# Patient Record
Sex: FEMALE | Race: WHITE | Employment: FULL TIME | URBAN - METROPOLITAN AREA
[De-identification: names, ages, dates, MRNs, and addresses within clinical notes are randomized per-mention and may not be internally consistent; named-entity substitution may affect disease eponyms.]

---

## 2017-01-23 RX ORDER — ATORVASTATIN CALCIUM 40 MG/1
TABLET, FILM COATED ORAL
Qty: 30 TABLET | Refills: 4 | Status: SHIPPED | OUTPATIENT
Start: 2017-01-23 | End: 2017-06-26 | Stop reason: SDUPTHER

## 2017-01-30 RX ORDER — GLIMEPIRIDE 4 MG/1
TABLET ORAL
Qty: 30 TABLET | Refills: 1 | Status: SHIPPED | OUTPATIENT
Start: 2017-01-30 | End: 2017-04-10 | Stop reason: SDUPTHER

## 2017-02-01 ENCOUNTER — OFFICE VISIT (OUTPATIENT)
Dept: FAMILY MEDICINE CLINIC | Age: 57
End: 2017-02-01

## 2017-02-01 VITALS
OXYGEN SATURATION: 98 % | BODY MASS INDEX: 32.19 KG/M2 | HEART RATE: 90 BPM | SYSTOLIC BLOOD PRESSURE: 140 MMHG | WEIGHT: 176 LBS | DIASTOLIC BLOOD PRESSURE: 84 MMHG

## 2017-02-01 DIAGNOSIS — H81.11 BENIGN POSITIONAL VERTIGO, RIGHT: Primary | ICD-10-CM

## 2017-02-01 PROCEDURE — 99213 OFFICE O/P EST LOW 20 MIN: CPT | Performed by: FAMILY MEDICINE

## 2017-02-15 ENCOUNTER — HOSPITAL ENCOUNTER (OUTPATIENT)
Dept: SURGERY | Age: 57
Discharge: HOME | End: 2017-02-15
Attending: PLASTIC SURGERY | Admitting: PLASTIC SURGERY

## 2017-02-15 VITALS
WEIGHT: 173.5 LBS | SYSTOLIC BLOOD PRESSURE: 134 MMHG | RESPIRATION RATE: 16 BRPM | OXYGEN SATURATION: 92 % | HEART RATE: 95 BPM | DIASTOLIC BLOOD PRESSURE: 75 MMHG | TEMPERATURE: 97.5 F | HEIGHT: 62 IN | BODY MASS INDEX: 31.93 KG/M2

## 2017-02-15 DIAGNOSIS — C50.911 MALIGNANT NEOPLASM OF RIGHT FEMALE BREAST (HCC): ICD-10-CM

## 2017-02-15 LAB
ANION GAP SERPL CALCULATED.3IONS-SCNC: 17 MMOL/L (ref 3–16)
BUN BLDV-MCNC: 14 MG/DL (ref 7–20)
CALCIUM SERPL-MCNC: 9.2 MG/DL (ref 8.3–10.6)
CHLORIDE BLD-SCNC: 105 MMOL/L (ref 99–110)
CO2: 21 MMOL/L (ref 21–32)
CREAT SERPL-MCNC: 0.6 MG/DL (ref 0.6–1.1)
GFR AFRICAN AMERICAN: >60
GFR NON-AFRICAN AMERICAN: >60
GLUCOSE BLD-MCNC: 116 MG/DL (ref 70–99)
GLUCOSE BLD-MCNC: 119 MG/DL (ref 70–99)
GLUCOSE BLD-MCNC: 129 MG/DL (ref 70–99)
HCT VFR BLD CALC: 40.4 % (ref 36–48)
HEMOGLOBIN: 13.4 G/DL (ref 12–16)
MCH RBC QN AUTO: 28 PG (ref 26–34)
MCHC RBC AUTO-ENTMCNC: 33.2 G/DL (ref 31–36)
MCV RBC AUTO: 84.3 FL (ref 80–100)
PDW BLD-RTO: 13.6 % (ref 12.4–15.4)
PERFORMED ON: ABNORMAL
PERFORMED ON: ABNORMAL
PLATELET # BLD: 304 K/UL (ref 135–450)
PMV BLD AUTO: 8.1 FL (ref 5–10.5)
POTASSIUM SERPL-SCNC: 4.5 MMOL/L (ref 3.5–5.1)
RBC # BLD: 4.79 M/UL (ref 4–5.2)
SODIUM BLD-SCNC: 143 MMOL/L (ref 136–145)
WBC # BLD: 9.7 K/UL (ref 4–11)

## 2017-02-15 RX ORDER — MEPERIDINE HYDROCHLORIDE 25 MG/ML
12.5 INJECTION INTRAMUSCULAR; INTRAVENOUS; SUBCUTANEOUS
Status: COMPLETED | OUTPATIENT
Start: 2017-02-15 | End: 2017-02-15

## 2017-02-15 RX ORDER — PROMETHAZINE HYDROCHLORIDE 25 MG/ML
6.25 INJECTION, SOLUTION INTRAMUSCULAR; INTRAVENOUS
Status: ACTIVE | OUTPATIENT
Start: 2017-02-15 | End: 2017-02-15

## 2017-02-15 RX ORDER — OXYCODONE AND ACETAMINOPHEN 7.5; 325 MG/1; MG/1
1 TABLET ORAL EVERY 4 HOURS PRN
Qty: 40 TABLET | Refills: 0 | Status: SHIPPED | OUTPATIENT
Start: 2017-02-15 | End: 2017-02-22

## 2017-02-15 RX ORDER — HYDROCODONE BITARTRATE AND ACETAMINOPHEN 5; 325 MG/1; MG/1
1 TABLET ORAL PRN
Status: COMPLETED | OUTPATIENT
Start: 2017-02-15 | End: 2017-02-15

## 2017-02-15 RX ORDER — LIDOCAINE HYDROCHLORIDE 10 MG/ML
0.5 INJECTION, SOLUTION EPIDURAL; INFILTRATION; INTRACAUDAL; PERINEURAL ONCE
Status: COMPLETED | OUTPATIENT
Start: 2017-02-15 | End: 2017-02-15

## 2017-02-15 RX ORDER — SODIUM CHLORIDE 0.9 % (FLUSH) 0.9 %
10 SYRINGE (ML) INJECTION EVERY 12 HOURS SCHEDULED
Status: DISCONTINUED | OUTPATIENT
Start: 2017-02-15 | End: 2017-02-16 | Stop reason: HOSPADM

## 2017-02-15 RX ORDER — SODIUM CHLORIDE 0.9 % (FLUSH) 0.9 %
10 SYRINGE (ML) INJECTION PRN
Status: DISCONTINUED | OUTPATIENT
Start: 2017-02-15 | End: 2017-02-16 | Stop reason: HOSPADM

## 2017-02-15 RX ORDER — LIDOCAINE HYDROCHLORIDE 10 MG/ML
1 INJECTION, SOLUTION EPIDURAL; INFILTRATION; INTRACAUDAL; PERINEURAL
Status: ACTIVE | OUTPATIENT
Start: 2017-02-15 | End: 2017-02-15

## 2017-02-15 RX ORDER — SODIUM CHLORIDE 9 MG/ML
INJECTION, SOLUTION INTRAVENOUS CONTINUOUS
Status: DISCONTINUED | OUTPATIENT
Start: 2017-02-15 | End: 2017-02-16 | Stop reason: HOSPADM

## 2017-02-15 RX ORDER — HYDROMORPHONE HCL 110MG/55ML
0.25 PATIENT CONTROLLED ANALGESIA SYRINGE INTRAVENOUS EVERY 5 MIN PRN
Status: DISCONTINUED | OUTPATIENT
Start: 2017-02-15 | End: 2017-02-16 | Stop reason: HOSPADM

## 2017-02-15 RX ORDER — HYDROCODONE BITARTRATE AND ACETAMINOPHEN 5; 325 MG/1; MG/1
2 TABLET ORAL PRN
Status: COMPLETED | OUTPATIENT
Start: 2017-02-15 | End: 2017-02-15

## 2017-02-15 RX ORDER — CEFAZOLIN SODIUM 2 G/100ML
2 INJECTION, SOLUTION INTRAVENOUS
Status: COMPLETED | OUTPATIENT
Start: 2017-02-15 | End: 2017-02-15

## 2017-02-15 RX ORDER — ONDANSETRON 2 MG/ML
4 INJECTION INTRAMUSCULAR; INTRAVENOUS
Status: ACTIVE | OUTPATIENT
Start: 2017-02-15 | End: 2017-02-15

## 2017-02-15 RX ORDER — CEPHALEXIN 500 MG/1
500 CAPSULE ORAL 4 TIMES DAILY
Qty: 28 CAPSULE | Refills: 0 | Status: SHIPPED | OUTPATIENT
Start: 2017-02-15 | End: 2017-06-23 | Stop reason: ALTCHOICE

## 2017-02-15 RX ORDER — HYDRALAZINE HYDROCHLORIDE 20 MG/ML
5 INJECTION INTRAMUSCULAR; INTRAVENOUS EVERY 10 MIN PRN
Status: DISCONTINUED | OUTPATIENT
Start: 2017-02-15 | End: 2017-02-16 | Stop reason: HOSPADM

## 2017-02-15 RX ORDER — LABETALOL HYDROCHLORIDE 5 MG/ML
5 INJECTION, SOLUTION INTRAVENOUS EVERY 10 MIN PRN
Status: DISCONTINUED | OUTPATIENT
Start: 2017-02-15 | End: 2017-02-16 | Stop reason: HOSPADM

## 2017-02-15 RX ORDER — HYDROMORPHONE HCL 110MG/55ML
0.5 PATIENT CONTROLLED ANALGESIA SYRINGE INTRAVENOUS EVERY 5 MIN PRN
Status: DISCONTINUED | OUTPATIENT
Start: 2017-02-15 | End: 2017-02-16 | Stop reason: HOSPADM

## 2017-02-15 RX ORDER — SODIUM CHLORIDE, SODIUM LACTATE, POTASSIUM CHLORIDE, CALCIUM CHLORIDE 600; 310; 30; 20 MG/100ML; MG/100ML; MG/100ML; MG/100ML
INJECTION, SOLUTION INTRAVENOUS CONTINUOUS
Status: DISCONTINUED | OUTPATIENT
Start: 2017-02-15 | End: 2017-02-16 | Stop reason: HOSPADM

## 2017-02-15 RX ORDER — DIPHENHYDRAMINE HYDROCHLORIDE 50 MG/ML
12.5 INJECTION INTRAMUSCULAR; INTRAVENOUS
Status: ACTIVE | OUTPATIENT
Start: 2017-02-15 | End: 2017-02-15

## 2017-02-15 RX ADMIN — MEPERIDINE HYDROCHLORIDE 12.5 MG: 25 INJECTION INTRAMUSCULAR; INTRAVENOUS; SUBCUTANEOUS at 11:52

## 2017-02-15 RX ADMIN — HYDROCODONE BITARTRATE AND ACETAMINOPHEN 1 TABLET: 5; 325 TABLET ORAL at 12:55

## 2017-02-15 RX ADMIN — SODIUM CHLORIDE: 9 INJECTION, SOLUTION INTRAVENOUS at 09:12

## 2017-02-15 RX ADMIN — Medication 0.5 MG: at 12:04

## 2017-02-15 RX ADMIN — LIDOCAINE HYDROCHLORIDE 0.2 ML: 10 INJECTION, SOLUTION EPIDURAL; INFILTRATION; INTRACAUDAL; PERINEURAL at 09:12

## 2017-02-15 RX ADMIN — CEFAZOLIN SODIUM 2 G: 2 INJECTION, SOLUTION INTRAVENOUS at 09:50

## 2017-02-15 ASSESSMENT — PAIN DESCRIPTION - LOCATION
LOCATION: BREAST
LOCATION: BREAST

## 2017-02-15 ASSESSMENT — PAIN DESCRIPTION - PAIN TYPE
TYPE: SURGICAL PAIN
TYPE: SURGICAL PAIN

## 2017-02-15 ASSESSMENT — PAIN SCALES - GENERAL
PAINLEVEL_OUTOF10: 5
PAINLEVEL_OUTOF10: 10
PAINLEVEL_OUTOF10: 5
PAINLEVEL_OUTOF10: 3

## 2017-02-15 ASSESSMENT — PAIN - FUNCTIONAL ASSESSMENT: PAIN_FUNCTIONAL_ASSESSMENT: 0-10

## 2017-02-15 ASSESSMENT — PAIN DESCRIPTION - ORIENTATION
ORIENTATION: RIGHT;LEFT
ORIENTATION: RIGHT;LEFT

## 2017-03-17 ENCOUNTER — OFFICE VISIT (OUTPATIENT)
Dept: FAMILY MEDICINE CLINIC | Age: 57
End: 2017-03-17

## 2017-03-17 VITALS
DIASTOLIC BLOOD PRESSURE: 78 MMHG | SYSTOLIC BLOOD PRESSURE: 132 MMHG | WEIGHT: 179 LBS | OXYGEN SATURATION: 98 % | HEART RATE: 84 BPM | BODY MASS INDEX: 32.74 KG/M2

## 2017-03-17 DIAGNOSIS — E11.9 TYPE 2 DIABETES MELLITUS WITHOUT COMPLICATION, WITHOUT LONG-TERM CURRENT USE OF INSULIN (HCC): Primary | ICD-10-CM

## 2017-03-17 DIAGNOSIS — E66.9 OBESITY (BMI 30-39.9): ICD-10-CM

## 2017-03-17 DIAGNOSIS — E78.00 ELEVATED CHOLESTEROL: ICD-10-CM

## 2017-03-17 PROCEDURE — 99214 OFFICE O/P EST MOD 30 MIN: CPT | Performed by: FAMILY MEDICINE

## 2017-03-17 ASSESSMENT — ENCOUNTER SYMPTOMS
CONSTIPATION: 0
TROUBLE SWALLOWING: 0
BACK PAIN: 0
COUGH: 0
ABDOMINAL PAIN: 0
SHORTNESS OF BREATH: 0

## 2017-04-01 ENCOUNTER — HOSPITAL ENCOUNTER (OUTPATIENT)
Dept: OTHER | Age: 57
Discharge: HOME | End: 2017-04-01
Attending: FAMILY MEDICINE | Admitting: FAMILY MEDICINE

## 2017-04-01 LAB
A/G RATIO: 1.3 (ref 1.1–2.2)
ALBUMIN SERPL-MCNC: 3.9 G/DL (ref 3.4–5)
ALP BLD-CCNC: 86 U/L (ref 40–129)
ALT SERPL-CCNC: 23 U/L (ref 10–40)
ANION GAP SERPL CALCULATED.3IONS-SCNC: 12 MMOL/L (ref 3–16)
AST SERPL-CCNC: 30 U/L (ref 15–37)
BILIRUB SERPL-MCNC: 0.3 MG/DL (ref 0–1)
BUN BLDV-MCNC: 11 MG/DL (ref 7–20)
CALCIUM SERPL-MCNC: 9.1 MG/DL (ref 8.3–10.6)
CHLORIDE BLD-SCNC: 106 MMOL/L (ref 99–110)
CHOLESTEROL, TOTAL: 128 MG/DL (ref 0–199)
CO2: 27 MMOL/L (ref 21–32)
CREAT SERPL-MCNC: 0.8 MG/DL (ref 0.6–1.1)
GFR AFRICAN AMERICAN: >60
GFR NON-AFRICAN AMERICAN: >60
GLOBULIN: 3 G/DL
GLUCOSE BLD-MCNC: 96 MG/DL (ref 70–99)
HDLC SERPL-MCNC: 45 MG/DL (ref 40–60)
LDL CHOLESTEROL CALCULATED: 65 MG/DL
POTASSIUM SERPL-SCNC: 4.9 MMOL/L (ref 3.5–5.1)
SODIUM BLD-SCNC: 145 MMOL/L (ref 136–145)
TOTAL PROTEIN: 6.9 G/DL (ref 6.4–8.2)
TRIGL SERPL-MCNC: 91 MG/DL (ref 0–150)
VLDLC SERPL CALC-MCNC: 18 MG/DL

## 2017-04-02 LAB
ESTIMATED AVERAGE GLUCOSE: 145.6 MG/DL
HBA1C MFR BLD: 6.7 %

## 2017-04-10 RX ORDER — GLIMEPIRIDE 4 MG/1
TABLET ORAL
Qty: 30 TABLET | Refills: 5 | Status: SHIPPED | OUTPATIENT
Start: 2017-04-10 | End: 2017-10-16

## 2017-04-27 RX ORDER — OXYBUTYNIN CHLORIDE 15 MG/1
TABLET, EXTENDED RELEASE ORAL
Qty: 30 TABLET | Refills: 5 | Status: SHIPPED | OUTPATIENT
Start: 2017-04-27 | End: 2018-02-13 | Stop reason: SDUPTHER

## 2017-05-04 ENCOUNTER — TELEPHONE (OUTPATIENT)
Dept: SURGERY | Age: 57
End: 2017-05-04

## 2017-05-11 ENCOUNTER — OFFICE VISIT (OUTPATIENT)
Dept: SURGERY | Age: 57
End: 2017-05-11

## 2017-05-11 VITALS
TEMPERATURE: 97.9 F | DIASTOLIC BLOOD PRESSURE: 97 MMHG | WEIGHT: 172 LBS | HEART RATE: 85 BPM | SYSTOLIC BLOOD PRESSURE: 140 MMHG | BODY MASS INDEX: 31.46 KG/M2

## 2017-05-11 DIAGNOSIS — Z85.3 PERSONAL HISTORY OF BREAST CANCER: Primary | ICD-10-CM

## 2017-05-11 PROCEDURE — 99213 OFFICE O/P EST LOW 20 MIN: CPT | Performed by: SURGERY

## 2017-05-11 RX ORDER — BACLOFEN 20 MG/1
20 TABLET ORAL
COMMUNITY
End: 2018-08-24

## 2017-06-23 ENCOUNTER — OFFICE VISIT (OUTPATIENT)
Dept: FAMILY MEDICINE CLINIC | Age: 57
End: 2017-06-23

## 2017-06-23 VITALS
WEIGHT: 179 LBS | DIASTOLIC BLOOD PRESSURE: 74 MMHG | BODY MASS INDEX: 32.74 KG/M2 | SYSTOLIC BLOOD PRESSURE: 130 MMHG | HEART RATE: 91 BPM | OXYGEN SATURATION: 97 %

## 2017-06-23 DIAGNOSIS — G89.29 CHRONIC BILATERAL LOW BACK PAIN WITHOUT SCIATICA: ICD-10-CM

## 2017-06-23 DIAGNOSIS — K76.0 FATTY LIVER DISEASE, NONALCOHOLIC: ICD-10-CM

## 2017-06-23 DIAGNOSIS — M54.50 CHRONIC BILATERAL LOW BACK PAIN WITHOUT SCIATICA: ICD-10-CM

## 2017-06-23 DIAGNOSIS — E11.9 TYPE 2 DIABETES MELLITUS WITHOUT COMPLICATION, WITHOUT LONG-TERM CURRENT USE OF INSULIN (HCC): Primary | ICD-10-CM

## 2017-06-23 DIAGNOSIS — R11.0 NAUSEA: ICD-10-CM

## 2017-06-23 PROCEDURE — 99214 OFFICE O/P EST MOD 30 MIN: CPT | Performed by: FAMILY MEDICINE

## 2017-06-23 RX ORDER — METFORMIN HYDROCHLORIDE 500 MG/1
1000 TABLET, EXTENDED RELEASE ORAL 2 TIMES DAILY
Qty: 120 TABLET | Refills: 3 | Status: SHIPPED | OUTPATIENT
Start: 2017-06-23 | End: 2018-03-28 | Stop reason: DRUGHIGH

## 2017-06-23 ASSESSMENT — ENCOUNTER SYMPTOMS
BACK PAIN: 1
CONSTIPATION: 0
SHORTNESS OF BREATH: 0
NAUSEA: 1
TROUBLE SWALLOWING: 0
COUGH: 0
ABDOMINAL PAIN: 0

## 2017-06-26 RX ORDER — ATORVASTATIN CALCIUM 40 MG/1
TABLET, FILM COATED ORAL
Qty: 30 TABLET | Refills: 3 | Status: SHIPPED | OUTPATIENT
Start: 2017-06-26 | End: 2017-11-07 | Stop reason: SDUPTHER

## 2017-08-24 ENCOUNTER — OFFICE VISIT (OUTPATIENT)
Dept: FAMILY MEDICINE CLINIC | Age: 57
End: 2017-08-24

## 2017-08-24 VITALS
HEART RATE: 80 BPM | OXYGEN SATURATION: 97 % | WEIGHT: 176 LBS | SYSTOLIC BLOOD PRESSURE: 130 MMHG | DIASTOLIC BLOOD PRESSURE: 86 MMHG | BODY MASS INDEX: 32.19 KG/M2

## 2017-08-24 DIAGNOSIS — R73.9 HYPERGLYCEMIA: ICD-10-CM

## 2017-08-24 DIAGNOSIS — R42 DIZZINESS: ICD-10-CM

## 2017-08-24 DIAGNOSIS — R41.82 ALTERED MENTAL STATUS, UNSPECIFIED ALTERED MENTAL STATUS TYPE: Primary | ICD-10-CM

## 2017-08-24 PROCEDURE — 99214 OFFICE O/P EST MOD 30 MIN: CPT | Performed by: PHYSICIAN ASSISTANT

## 2017-08-24 ASSESSMENT — ENCOUNTER SYMPTOMS
SORE THROAT: 0
SHORTNESS OF BREATH: 0
BACK PAIN: 0
VOICE CHANGE: 0
CONSTIPATION: 0
ABDOMINAL PAIN: 0
DIARRHEA: 0
COUGH: 0
CHEST TIGHTNESS: 0
TROUBLE SWALLOWING: 0
EYE PAIN: 0

## 2017-09-15 ENCOUNTER — OFFICE VISIT (OUTPATIENT)
Dept: SURGERY | Age: 57
End: 2017-09-15

## 2017-09-15 ENCOUNTER — HOSPITAL ENCOUNTER (OUTPATIENT)
Dept: MAMMOGRAPHY | Age: 57
Discharge: HOME | End: 2017-09-15

## 2017-09-15 VITALS
DIASTOLIC BLOOD PRESSURE: 86 MMHG | SYSTOLIC BLOOD PRESSURE: 139 MMHG | WEIGHT: 175 LBS | HEIGHT: 62 IN | HEART RATE: 82 BPM | BODY MASS INDEX: 32.2 KG/M2

## 2017-09-15 DIAGNOSIS — Z85.3 PERSONAL HISTORY OF BREAST CANCER: Primary | ICD-10-CM

## 2017-09-15 DIAGNOSIS — Z12.31 SCREENING MAMMOGRAM, ENCOUNTER FOR: ICD-10-CM

## 2017-09-15 DIAGNOSIS — C50.911 MALIGNANT NEOPLASM OF RIGHT FEMALE BREAST (HCC): ICD-10-CM

## 2017-09-15 DIAGNOSIS — Z85.3 PERSONAL HISTORY OF BREAST CANCER: ICD-10-CM

## 2017-09-15 PROCEDURE — 99213 OFFICE O/P EST LOW 20 MIN: CPT | Performed by: SURGERY

## 2017-09-25 ENCOUNTER — OFFICE VISIT (OUTPATIENT)
Dept: FAMILY MEDICINE CLINIC | Age: 57
End: 2017-09-25

## 2017-09-25 VITALS
HEART RATE: 89 BPM | DIASTOLIC BLOOD PRESSURE: 78 MMHG | SYSTOLIC BLOOD PRESSURE: 110 MMHG | OXYGEN SATURATION: 97 % | WEIGHT: 170 LBS | BODY MASS INDEX: 31.09 KG/M2

## 2017-09-25 DIAGNOSIS — R35.0 URINARY FREQUENCY: Primary | ICD-10-CM

## 2017-09-25 DIAGNOSIS — E78.00 ELEVATED CHOLESTEROL: ICD-10-CM

## 2017-09-25 DIAGNOSIS — E11.9 TYPE 2 DIABETES MELLITUS WITHOUT COMPLICATION, WITHOUT LONG-TERM CURRENT USE OF INSULIN (HCC): ICD-10-CM

## 2017-09-25 DIAGNOSIS — K76.0 FATTY LIVER DISEASE, NONALCOHOLIC: ICD-10-CM

## 2017-09-25 PROCEDURE — 90471 IMMUNIZATION ADMIN: CPT | Performed by: FAMILY MEDICINE

## 2017-09-25 PROCEDURE — 99214 OFFICE O/P EST MOD 30 MIN: CPT | Performed by: FAMILY MEDICINE

## 2017-09-25 PROCEDURE — 90630 INFLUENZA, QUADV, 18-64 YRS, ID, PF, MICRO INJ, 0.1ML (FLUZONE QUADV, PF): CPT | Performed by: FAMILY MEDICINE

## 2017-09-25 PROCEDURE — 81000 URINALYSIS NONAUTO W/SCOPE: CPT | Performed by: FAMILY MEDICINE

## 2017-09-25 RX ORDER — LINAGLIPTIN 5 MG/1
TABLET, FILM COATED ORAL
Qty: 30 TABLET | Refills: 4 | Status: SHIPPED | OUTPATIENT
Start: 2017-09-25 | End: 2018-04-04 | Stop reason: SDUPTHER

## 2017-09-25 ASSESSMENT — ENCOUNTER SYMPTOMS
ABDOMINAL PAIN: 0
SHORTNESS OF BREATH: 0
BACK PAIN: 1
CONSTIPATION: 0
COUGH: 0
TROUBLE SWALLOWING: 0
NAUSEA: 0

## 2017-09-26 ENCOUNTER — TELEPHONE (OUTPATIENT)
Dept: SURGERY | Age: 57
End: 2017-09-26

## 2017-09-27 LAB
ORGANISM: ABNORMAL
URINE CULTURE, ROUTINE: ABNORMAL
URINE CULTURE, ROUTINE: ABNORMAL

## 2017-09-27 RX ORDER — SULFAMETHOXAZOLE AND TRIMETHOPRIM 800; 160 MG/1; MG/1
1 TABLET ORAL 2 TIMES DAILY
Qty: 14 TABLET | Refills: 0 | Status: SHIPPED | OUTPATIENT
Start: 2017-09-27 | End: 2017-10-04

## 2017-10-16 RX ORDER — GLIMEPIRIDE 4 MG/1
TABLET ORAL
Qty: 30 TABLET | Refills: 2 | Status: SHIPPED | OUTPATIENT
Start: 2017-10-16 | End: 2018-01-16 | Stop reason: SDUPTHER

## 2017-11-06 ENCOUNTER — TELEPHONE (OUTPATIENT)
Dept: SURGERY | Age: 57
End: 2017-11-06

## 2017-11-07 RX ORDER — ATORVASTATIN CALCIUM 40 MG/1
TABLET, FILM COATED ORAL
Qty: 30 TABLET | Refills: 1 | Status: SHIPPED | OUTPATIENT
Start: 2017-11-07 | End: 2018-01-15 | Stop reason: SDUPTHER

## 2017-12-13 ENCOUNTER — HOSPITAL ENCOUNTER (OUTPATIENT)
Dept: MRI IMAGING | Age: 57
Discharge: HOME | End: 2017-12-13
Attending: INTERNAL MEDICINE | Admitting: INTERNAL MEDICINE

## 2017-12-13 DIAGNOSIS — G89.29 CHRONIC JAW PAIN: ICD-10-CM

## 2017-12-13 DIAGNOSIS — C50.911 MALIGNANT NEOPLASM OF RIGHT FEMALE BREAST (HCC): ICD-10-CM

## 2017-12-13 DIAGNOSIS — R68.84 CHRONIC JAW PAIN: ICD-10-CM

## 2017-12-13 RX ORDER — SODIUM CHLORIDE 0.9 % (FLUSH) 0.9 %
10 SYRINGE (ML) INJECTION ONCE
Status: COMPLETED | OUTPATIENT
Start: 2017-12-13 | End: 2017-12-13

## 2017-12-13 RX ADMIN — Medication 10 ML: at 13:15

## 2017-12-20 ENCOUNTER — TELEPHONE (OUTPATIENT)
Dept: SURGERY | Age: 57
End: 2017-12-20

## 2017-12-20 ENCOUNTER — HOSPITAL ENCOUNTER (OUTPATIENT)
Dept: OTHER | Age: 57
Discharge: HOME | End: 2017-12-20
Attending: FAMILY MEDICINE | Admitting: FAMILY MEDICINE

## 2017-12-20 DIAGNOSIS — E78.00 ELEVATED CHOLESTEROL: ICD-10-CM

## 2017-12-20 DIAGNOSIS — E11.9 TYPE 2 DIABETES MELLITUS WITHOUT COMPLICATION, WITHOUT LONG-TERM CURRENT USE OF INSULIN (HCC): Primary | ICD-10-CM

## 2017-12-20 DIAGNOSIS — E11.9 TYPE 2 DIABETES MELLITUS WITHOUT COMPLICATION, WITHOUT LONG-TERM CURRENT USE OF INSULIN (HCC): ICD-10-CM

## 2017-12-20 LAB
A/G RATIO: 1.3 (ref 1.1–2.2)
ALBUMIN SERPL-MCNC: 4.3 G/DL (ref 3.4–5)
ALP BLD-CCNC: 118 U/L (ref 40–129)
ALT SERPL-CCNC: 19 U/L (ref 10–40)
ANION GAP SERPL CALCULATED.3IONS-SCNC: 18 MMOL/L (ref 3–16)
AST SERPL-CCNC: 22 U/L (ref 15–37)
BILIRUB SERPL-MCNC: <0.2 MG/DL (ref 0–1)
BUN BLDV-MCNC: 13 MG/DL (ref 7–20)
CALCIUM SERPL-MCNC: 9.9 MG/DL (ref 8.3–10.6)
CHLORIDE BLD-SCNC: 103 MMOL/L (ref 99–110)
CHOLESTEROL, TOTAL: 145 MG/DL (ref 0–199)
CO2: 24 MMOL/L (ref 21–32)
CREAT SERPL-MCNC: 0.9 MG/DL (ref 0.6–1.1)
CREATININE URINE: 62.7 MG/DL (ref 28–259)
GFR AFRICAN AMERICAN: >60
GFR NON-AFRICAN AMERICAN: >60
GLOBULIN: 3.2 G/DL
GLUCOSE FASTING: 195 MG/DL (ref 70–99)
HDLC SERPL-MCNC: 53 MG/DL (ref 40–60)
LDL CHOLESTEROL CALCULATED: 43 MG/DL
MICROALBUMIN UR-MCNC: <1.2 MG/DL
MICROALBUMIN/CREAT UR-RTO: NORMAL MG/G (ref 0–30)
POTASSIUM SERPL-SCNC: 4.6 MMOL/L (ref 3.5–5.1)
SODIUM BLD-SCNC: 145 MMOL/L (ref 136–145)
TOTAL PROTEIN: 7.5 G/DL (ref 6.4–8.2)
TRIGL SERPL-MCNC: 244 MG/DL (ref 0–150)
VLDLC SERPL CALC-MCNC: 49 MG/DL

## 2017-12-21 LAB
ESTIMATED AVERAGE GLUCOSE: 165.7 MG/DL
HBA1C MFR BLD: 7.4 %

## 2017-12-27 ENCOUNTER — OFFICE VISIT (OUTPATIENT)
Dept: FAMILY MEDICINE CLINIC | Age: 57
End: 2017-12-27

## 2017-12-27 VITALS
SYSTOLIC BLOOD PRESSURE: 136 MMHG | BODY MASS INDEX: 31.46 KG/M2 | DIASTOLIC BLOOD PRESSURE: 90 MMHG | OXYGEN SATURATION: 98 % | WEIGHT: 172 LBS | HEART RATE: 100 BPM

## 2017-12-27 DIAGNOSIS — G89.29 CHRONIC BILATERAL LOW BACK PAIN WITHOUT SCIATICA: ICD-10-CM

## 2017-12-27 DIAGNOSIS — M54.50 CHRONIC BILATERAL LOW BACK PAIN WITHOUT SCIATICA: ICD-10-CM

## 2017-12-27 DIAGNOSIS — E78.00 ELEVATED CHOLESTEROL: ICD-10-CM

## 2017-12-27 DIAGNOSIS — E11.9 TYPE 2 DIABETES MELLITUS WITHOUT COMPLICATION, WITHOUT LONG-TERM CURRENT USE OF INSULIN (HCC): Primary | ICD-10-CM

## 2017-12-27 PROCEDURE — G8484 FLU IMMUNIZE NO ADMIN: HCPCS | Performed by: FAMILY MEDICINE

## 2017-12-27 PROCEDURE — G8417 CALC BMI ABV UP PARAM F/U: HCPCS | Performed by: FAMILY MEDICINE

## 2017-12-27 PROCEDURE — 3017F COLORECTAL CA SCREEN DOC REV: CPT | Performed by: FAMILY MEDICINE

## 2017-12-27 PROCEDURE — 3045F PR MOST RECENT HEMOGLOBIN A1C LEVEL 7.0-9.0%: CPT | Performed by: FAMILY MEDICINE

## 2017-12-27 PROCEDURE — G8427 DOCREV CUR MEDS BY ELIG CLIN: HCPCS | Performed by: FAMILY MEDICINE

## 2017-12-27 PROCEDURE — 3014F SCREEN MAMMO DOC REV: CPT | Performed by: FAMILY MEDICINE

## 2017-12-27 PROCEDURE — 99214 OFFICE O/P EST MOD 30 MIN: CPT | Performed by: FAMILY MEDICINE

## 2017-12-27 PROCEDURE — 1036F TOBACCO NON-USER: CPT | Performed by: FAMILY MEDICINE

## 2017-12-27 RX ORDER — TRAMADOL HYDROCHLORIDE 50 MG/1
50 TABLET ORAL EVERY 6 HOURS PRN
Qty: 60 TABLET | Refills: 2 | Status: SHIPPED | OUTPATIENT
Start: 2017-12-27 | End: 2018-08-24 | Stop reason: SDUPTHER

## 2017-12-27 ASSESSMENT — ENCOUNTER SYMPTOMS
ABDOMINAL PAIN: 0
BACK PAIN: 1
CONSTIPATION: 0
COUGH: 0
TROUBLE SWALLOWING: 0
NAUSEA: 0
SHORTNESS OF BREATH: 0

## 2017-12-27 NOTE — PROGRESS NOTES
Subjective:      Patient ID: Zechariah Linares is a 65 y.o. female. HPI Patient presents for a follow up diabetes and elevated cholesterol. And low back pain. She complains of pain at times in her lower back and takes tramadol for this without any complications. OARRS report accessed and reviewed     Treatment Adherence:   Medication compliance:  compliant all of the time  Diet compliance:  noncompliant: Does not follow any special diet  Weight trend: stable  Current exercise: Mild exercise at work where she works as a   Barriers: none    Diabetes Mellitus Type 2: Current symptoms/problems include none. Home blood sugar records: patient does not test  Any episodes of hypoglycemia? no  Eye exam current (within one year): no  Tobacco history: She  reports that she has quit smoking. She has a 10.00 pack-year smoking history. She has never used smokeless tobacco.   Daily Aspirin? Yes    Hypertension:  Home blood pressure monitoring: No.  She is not adherent to a low sodium diet. Patient denies chest pain and shortness of breath. Antihypertensive medication side effects: no medication side effects noted. Use of agents associated with hypertension: none. Hyperlipidemia:  No new myalgias or GI upset on atorvastatin (Lipitor).        Lab Results   Component Value Date    LABA1C 7.4 12/20/2017    LABA1C 6.7 04/01/2017    LABA1C 6.8 11/05/2016     Lab Results   Component Value Date    LABMICR <1.20 12/20/2017    CREATININE 0.9 12/20/2017     Lab Results   Component Value Date    ALT 19 12/20/2017    AST 22 12/20/2017     Lab Results   Component Value Date    CHOL 145 12/20/2017    TRIG 244 (H) 12/20/2017    HDL 53 12/20/2017    LDLCALC 43 12/20/2017        Current Outpatient Prescriptions on File Prior to Visit   Medication Sig Dispense Refill    atorvastatin (LIPITOR) 40 MG tablet TAKE ONE TABLET BY MOUTH DAILY 30 tablet 1    glimepiride (AMARYL) 4 MG tablet TAKE ONE TABLET BY MOUTH EVERY MORNING BEFORE BREAKFAST 30 tablet 2    TRADJENTA 5 MG tablet TAKE ONE TABLET BY MOUTH DAILY 30 tablet 4    tamoxifen (NOLVADEX) 20 MG tablet TAKE ONE TABLET BY MOUTH DAILY 30 tablet 4    metFORMIN (GLUCOPHAGE XR) 500 MG extended release tablet Take 2 tablets by mouth 2 times daily 120 tablet 3    baclofen (LIORESAL) 20 MG tablet Take 20 mg by mouth      oxybutynin (DITROPAN XL) 15 MG extended release tablet TAKE ONE TABLET BY MOUTH DAILY 30 tablet 5    prochlorperazine (COMPAZINE) 10 MG tablet 1 tab at bedtime to prevent nausea 30 tablet 1    glucose blood VI test strips (ONE TOUCH TEST STRIPS) strip 1 each by In Vitro route 2 times daily 100 each 3    ONE TOUCH LANCETS MISC 1 each by Does not apply route 2 times daily 100 each 3    Blood Glucose Monitoring Suppl (ONE TOUCH ULTRA 2) W/DEVICE KIT 1 kit by Does not apply route daily 1 kit 0    aspirin EC 81 MG EC tablet Take 1 tablet by mouth daily 30 tablet 12     No current facility-administered medications on file prior to visit. Social History     Social History    Marital status:      Spouse name: N/A    Number of children: N/A    Years of education: N/A     Occupational History    Not on file. Social History Main Topics    Smoking status: Former Smoker     Packs/day: 1.00     Years: 10.00    Smokeless tobacco: Never Used    Alcohol use 0.0 oz/week      Comment: occasional    Drug use: No    Sexual activity: Not on file     Other Topics Concern    Not on file     Social History Narrative    No narrative on file         Review of Systems   Constitutional: Negative for fatigue and fever. HENT: Negative for hearing loss and trouble swallowing. Eyes: Negative for visual disturbance. Respiratory: Negative for cough and shortness of breath. Cardiovascular: Negative for chest pain. Gastrointestinal: Negative for abdominal pain, constipation and nausea. Genitourinary: Negative for difficulty urinating and frequency.

## 2018-01-05 ENCOUNTER — TELEPHONE (OUTPATIENT)
Dept: SURGERY | Age: 58
End: 2018-01-05

## 2018-01-05 NOTE — TELEPHONE ENCOUNTER
Patient returned call to office to inform us she saw Dr. Leonela Oviedo about her right breast implant, and right breast pain. She is continuing to have pain for the past 2 months. Stated he said\" It looks like it shrank, could be leaking I don't know. \" I was told he said he \"could take her back to the o.r. To fix it. \" Patient and her  not happy with this visit. They inquired about Dr. Silverio Generous number again, number given. They want a second opinion.

## 2018-01-05 NOTE — TELEPHONE ENCOUNTER
Patient's  Gregoria Mcknight, states wife has been experiencing pain, right side breast, where she had a mastectomy and implant 4/16 @ [facility]. Patient was referred by Dr Laisha Jamison and is requesting return call to schedule appointment.

## 2018-01-09 ENCOUNTER — OFFICE VISIT (OUTPATIENT)
Dept: SURGERY | Age: 58
End: 2018-01-09

## 2018-01-09 VITALS
TEMPERATURE: 98.5 F | HEART RATE: 90 BPM | BODY MASS INDEX: 32.02 KG/M2 | WEIGHT: 174 LBS | DIASTOLIC BLOOD PRESSURE: 80 MMHG | HEIGHT: 62 IN | SYSTOLIC BLOOD PRESSURE: 144 MMHG | OXYGEN SATURATION: 98 %

## 2018-01-09 DIAGNOSIS — C50.911 MALIGNANT NEOPLASM OF RIGHT FEMALE BREAST, UNSPECIFIED ESTROGEN RECEPTOR STATUS, UNSPECIFIED SITE OF BREAST (HCC): Primary | ICD-10-CM

## 2018-01-09 PROCEDURE — G8417 CALC BMI ABV UP PARAM F/U: HCPCS | Performed by: SURGERY

## 2018-01-09 PROCEDURE — 3017F COLORECTAL CA SCREEN DOC REV: CPT | Performed by: SURGERY

## 2018-01-09 PROCEDURE — G8427 DOCREV CUR MEDS BY ELIG CLIN: HCPCS | Performed by: SURGERY

## 2018-01-09 PROCEDURE — 3014F SCREEN MAMMO DOC REV: CPT | Performed by: SURGERY

## 2018-01-09 PROCEDURE — 1036F TOBACCO NON-USER: CPT | Performed by: SURGERY

## 2018-01-09 PROCEDURE — G8484 FLU IMMUNIZE NO ADMIN: HCPCS | Performed by: SURGERY

## 2018-01-09 PROCEDURE — 99205 OFFICE O/P NEW HI 60 MIN: CPT | Performed by: SURGERY

## 2018-01-09 ASSESSMENT — ENCOUNTER SYMPTOMS
NAUSEA: 0
VOMITING: 0
ABDOMINAL PAIN: 0
SHORTNESS OF BREATH: 0
BACK PAIN: 1
EYE PAIN: 0

## 2018-01-09 NOTE — PROGRESS NOTES
[facility]    CC: Breast CA     Referring physician: Mitzy Otoole MD    HPI: This is a 65 y.o. female with a PMHx as delineated below who presents in consultation for breast reconstruction. She underwent right mastectomy with immediate tissue expander placement by Dr. Nasim España. She then underwent an exchange for permanent implant (685cc MemoryShape, medium height , high profile) and matching contralateral mastopexy. She also has completed a nipple reconstruction on the right. She is not happy with the results. She feels that they asymmetric, flat,  and she is having significant pain on the right that has been painful on the lateral aspect. Plastic surgery was consulted for a second opinion regarding the above mentioned complaints.   The specifics of her breast cancer workup / treatment is as follows:     Diagnosis: DCIS  Mo/Yr Diagnosed:   Breast Involved: Right  Surgery: Right mastectomy with implant based reconstruction  Date of Surgery: 16  Tumor Size & rdGrdrrdarddrderd:rd rd3rd Luis Enrique status: Negative  ER: Positive MN: Positive HER2: Unknown    Post Op Plan:  Oncologist: Tiffany Castro MD  Radiation: No    Chemo/Meds: No  Pregnancy/Miscarriages: 2 / 0    PMHx:   Past Medical History:   Diagnosis Date    Cancer ([address]     right breast    Dizziness     Headache(784.0)     Hyperlipidemia     Papilloma of breast     bilateral    Type II or unspecified type diabetes mellitus without mention of complication, not stated as uncontrolled    Hemoglobin A1c - 7.4 ()    PSHx:   Past Surgical History:   Procedure Laterality Date    BREAST BIOPSY Bilateral 06/15/2016    : RIGHT NEEDLE LOCALIZATION OF TWO SITES EXCISIONAL BREAST    BREAST SURGERY Bilateral 02/15/2017    SECOND STAGE RIGHT BREAST RECONSTRUCTION WITH PLACEMENT OF SILICONE BREAST IMPLANT ; left breast reduction     SECTION      MASTECTOMY Right 2016     RIGHT SKIN SAPARING MASTECTOMY WITH Tc99 AND METHYLANE easily (Tamoxifin). Psychiatric/Behavioral: Negative for depression and suicidal ideas. EXAM    GEN: NAD  CVS: RRR  RESP: No respiratory distress  ABD: soft/NT/obese  BACK: Bilateral latiss function intact  BREAST:   Physical Exam    Bra size: 42B  Desired bra size: C  Ptosis grade:   R: 1   L: 2  The left breast size is larger than the right breast.  There were no palpable masses with no palpable lymphadenopathy in the ipsilateral right axilla. Nipple retraction: No  Deficiency inferior aspect of the right breast  Nipple without areolar reconstruction  Excess tissue laterally on both breasts    Left breast sternal notch to nipple: 23.5 cm  Right breast sternal notch to nipple: 20 cm    Left breast nipple to inframammary fold: 7 cm  Right breast nipple to inframammary fold: 6.5 cm    Left breast width 17 cm  Right breast width 17 cm    IMAGING: US breast   Narrative   EXAMINATION:   2 SITE ULTRASOUND-GUIDED LEFT BREAST BIOPSY WITH VACUUM-ASSIST       ULTRASOUND-GUIDED CLIP PLACEMENT             IMP: 65 y.o. female with breast cancer who presents for discussion regarding revision breast reconstruction options  PLAN: Would benefit from excision of her lateral breast tissue, exchange for larger shaped implant, & possible inferior fat grafting. Would then perform a staged areolar reconstruction with a FTSG from the groin. She needs to optimize her sugars (<6.5) and improve her nutrition / protein intake in the interim. Will see again in March and then work toward surgery in April/May. A discussion regarding surgical options including immediate vs delayed approaches, implant based vs autologous, as well as additional planned revisional surgeries was performed with the patient and family. Multiple autologous donor sites were discussed as well. The risks, benefits, alternatives, outcomes, personnel involved were discussed.   Specifically, the risks including, but not limited to: bleeding that may necessitate transfusion or operation, infection, seroma, reoperation, nonhealing, poor cosmetic outcome, asymmetry, loss of implant, scarring, partial or total flap loss, donor site morbidity, VTE (DVT/PE), and death were discussed. All questions were answered in a satisfactory manner according to the patient. This consultation lasted 60 minutes with > 50% of the time spent in counseling and coordination of care.     Roberto Naranjo MD  [facility]  01/09/18

## 2018-01-09 NOTE — Clinical Note
Very nice family. I think that she would benefit from excision of her lateral breast tissue bilaterally, implant exchange with possible fat grafting inferiorly, followed by a staged areolar reconstruction. She needs to optimize her glycemic control prior to surgery and I will see her in March to ensure that she is doing well. She seems very motivated. Plan for potential surgery in April/May. Let me know if you need anything from me. Thanks!!  Hilton Mary

## 2018-01-15 RX ORDER — ATORVASTATIN CALCIUM 40 MG/1
TABLET, FILM COATED ORAL
Qty: 30 TABLET | Refills: 11 | Status: SHIPPED | OUTPATIENT
Start: 2018-01-15 | End: 2019-01-23 | Stop reason: SDUPTHER

## 2018-01-16 RX ORDER — GLIMEPIRIDE 4 MG/1
TABLET ORAL
Qty: 30 TABLET | Refills: 1 | Status: SHIPPED | OUTPATIENT
Start: 2018-01-16 | End: 2018-03-18 | Stop reason: SDUPTHER

## 2018-02-13 RX ORDER — OXYBUTYNIN CHLORIDE 15 MG/1
TABLET, EXTENDED RELEASE ORAL
Qty: 30 TABLET | Refills: 4 | Status: SHIPPED | OUTPATIENT
Start: 2018-02-13 | End: 2018-07-12 | Stop reason: SDUPTHER

## 2018-03-05 ENCOUNTER — TELEPHONE (OUTPATIENT)
Dept: FAMILY MEDICINE CLINIC | Age: 58
End: 2018-03-05

## 2018-03-05 NOTE — TELEPHONE ENCOUNTER
Patient's  states [address] is waiting for a prior authorization. TRADJENTA 5 MG tablet 30 tablet 4 9/25/2017     Sig: TAKE ONE TABLET BY MOUTH DAILY        Pharmacy:  [address] [phone]        Please call patient to advise.     [phone]

## 2018-03-07 ENCOUNTER — TELEPHONE (OUTPATIENT)
Dept: FAMILY MEDICINE CLINIC | Age: 58
End: 2018-03-07

## 2018-03-19 RX ORDER — GLIMEPIRIDE 4 MG/1
TABLET ORAL
Qty: 30 TABLET | Refills: 0 | Status: SHIPPED | OUTPATIENT
Start: 2018-03-19 | End: 2018-04-17 | Stop reason: SDUPTHER

## 2018-03-21 ENCOUNTER — OFFICE VISIT (OUTPATIENT)
Dept: SURGERY | Age: 58
End: 2018-03-21

## 2018-03-21 VITALS
BODY MASS INDEX: 31.83 KG/M2 | WEIGHT: 173 LBS | HEIGHT: 62 IN | TEMPERATURE: 98.5 F | DIASTOLIC BLOOD PRESSURE: 89 MMHG | OXYGEN SATURATION: 99 % | RESPIRATION RATE: 15 BRPM | HEART RATE: 81 BPM | SYSTOLIC BLOOD PRESSURE: 170 MMHG

## 2018-03-21 DIAGNOSIS — C50.911 MALIGNANT NEOPLASM OF RIGHT FEMALE BREAST, UNSPECIFIED ESTROGEN RECEPTOR STATUS, UNSPECIFIED SITE OF BREAST (HCC): Primary | ICD-10-CM

## 2018-03-21 PROCEDURE — 99999 PR OFFICE/OUTPT VISIT,PROCEDURE ONLY: CPT | Performed by: SURGERY

## 2018-03-24 ENCOUNTER — HOSPITAL ENCOUNTER (OUTPATIENT)
Dept: OTHER | Age: 58
Discharge: HOME | End: 2018-03-24
Attending: FAMILY MEDICINE | Admitting: FAMILY MEDICINE

## 2018-03-24 DIAGNOSIS — E11.9 TYPE 2 DIABETES MELLITUS WITHOUT COMPLICATION, WITHOUT LONG-TERM CURRENT USE OF INSULIN (HCC): ICD-10-CM

## 2018-03-25 LAB
ESTIMATED AVERAGE GLUCOSE: 157.1 MG/DL
HBA1C MFR BLD: 7.1 %

## 2018-03-28 ENCOUNTER — OFFICE VISIT (OUTPATIENT)
Dept: FAMILY MEDICINE CLINIC | Age: 58
End: 2018-03-28

## 2018-03-28 VITALS
OXYGEN SATURATION: 98 % | WEIGHT: 174 LBS | HEART RATE: 90 BPM | SYSTOLIC BLOOD PRESSURE: 128 MMHG | HEIGHT: 61 IN | BODY MASS INDEX: 32.85 KG/M2 | DIASTOLIC BLOOD PRESSURE: 84 MMHG

## 2018-03-28 DIAGNOSIS — G89.29 CHRONIC MIDLINE THORACIC BACK PAIN: ICD-10-CM

## 2018-03-28 DIAGNOSIS — N39.41 URGE INCONTINENCE: ICD-10-CM

## 2018-03-28 DIAGNOSIS — E66.9 OBESITY (BMI 30-39.9): ICD-10-CM

## 2018-03-28 DIAGNOSIS — M54.6 CHRONIC MIDLINE THORACIC BACK PAIN: ICD-10-CM

## 2018-03-28 DIAGNOSIS — E11.9 TYPE 2 DIABETES MELLITUS WITHOUT COMPLICATION, WITHOUT LONG-TERM CURRENT USE OF INSULIN (HCC): Primary | ICD-10-CM

## 2018-03-28 PROCEDURE — 3045F PR MOST RECENT HEMOGLOBIN A1C LEVEL 7.0-9.0%: CPT | Performed by: FAMILY MEDICINE

## 2018-03-28 PROCEDURE — G8482 FLU IMMUNIZE ORDER/ADMIN: HCPCS | Performed by: FAMILY MEDICINE

## 2018-03-28 PROCEDURE — G8417 CALC BMI ABV UP PARAM F/U: HCPCS | Performed by: FAMILY MEDICINE

## 2018-03-28 PROCEDURE — 3014F SCREEN MAMMO DOC REV: CPT | Performed by: FAMILY MEDICINE

## 2018-03-28 PROCEDURE — 1036F TOBACCO NON-USER: CPT | Performed by: FAMILY MEDICINE

## 2018-03-28 PROCEDURE — 3017F COLORECTAL CA SCREEN DOC REV: CPT | Performed by: FAMILY MEDICINE

## 2018-03-28 PROCEDURE — 99214 OFFICE O/P EST MOD 30 MIN: CPT | Performed by: FAMILY MEDICINE

## 2018-03-28 PROCEDURE — G8427 DOCREV CUR MEDS BY ELIG CLIN: HCPCS | Performed by: FAMILY MEDICINE

## 2018-03-28 RX ORDER — METFORMIN HYDROCHLORIDE 500 MG/1
500 TABLET, EXTENDED RELEASE ORAL 2 TIMES DAILY
Qty: 120 TABLET | Refills: 3 | Status: SHIPPED | OUTPATIENT
Start: 2018-03-28 | End: 2019-03-19 | Stop reason: SDUPTHER

## 2018-03-28 ASSESSMENT — ENCOUNTER SYMPTOMS
HEARTBURN: 0
CONSTIPATION: 0
BACK PAIN: 1
BLURRED VISION: 0
DOUBLE VISION: 0
DIARRHEA: 0
SHORTNESS OF BREATH: 0

## 2018-03-28 NOTE — PROGRESS NOTES
tablet 4    atorvastatin (LIPITOR) 40 MG tablet TAKE ONE TABLET BY MOUTH DAILY 30 tablet 11    TRADJENTA 5 MG tablet TAKE ONE TABLET BY MOUTH DAILY 30 tablet 4    tamoxifen (NOLVADEX) 20 MG tablet TAKE ONE TABLET BY MOUTH DAILY 30 tablet 4    baclofen (LIORESAL) 20 MG tablet Take 20 mg by mouth      glucose blood VI test strips (ONE TOUCH TEST STRIPS) strip 1 each by In Vitro route 2 times daily 100 each 3    ONE TOUCH LANCETS MISC 1 each by Does not apply route 2 times daily 100 each 3    Blood Glucose Monitoring Suppl (ONE TOUCH ULTRA 2) W/DEVICE KIT 1 kit by Does not apply route daily 1 kit 0    aspirin EC 81 MG EC tablet Take 1 tablet by mouth daily 30 tablet 12    traMADol (ULTRAM) 50 MG tablet Take 1 tablet by mouth every 6 hours as needed for Pain . 60 tablet 2     No current facility-administered medications on file prior to visit. Review of Systems   Constitutional: Negative for malaise/fatigue and weight loss. HENT: Negative for congestion and hearing loss. Eyes: Negative for blurred vision and double vision. Respiratory: Negative for shortness of breath. Cardiovascular: Negative for chest pain and leg swelling. Gastrointestinal: Negative for constipation, diarrhea and heartburn. Genitourinary: Positive for frequency. Musculoskeletal: Positive for back pain. Negative for myalgias. Neurological: Negative for sensory change and focal weakness. Endo/Heme/Allergies: Negative for polydipsia. Psychiatric/Behavioral: Negative for depression. Physical Exam   Constitutional: She is oriented to person, place, and time. She appears well-developed and well-nourished. obese   Eyes: EOM are normal. Pupils are equal, round, and reactive to light. Neck: Normal carotid pulses present. Carotid bruit is not present. No thyromegaly present. Cardiovascular: Normal rate, regular rhythm, normal heart sounds, intact distal pulses and normal pulses.     Pulmonary/Chest: Effort

## 2018-04-04 RX ORDER — LINAGLIPTIN 5 MG/1
TABLET, FILM COATED ORAL
Qty: 30 TABLET | Refills: 3 | Status: SHIPPED | OUTPATIENT
Start: 2018-04-04 | End: 2018-08-09 | Stop reason: SDUPTHER

## 2018-04-13 ENCOUNTER — HOSPITAL ENCOUNTER (OUTPATIENT)
Dept: ENDOSCOPY | Age: 58
Discharge: HOME | End: 2018-04-13
Attending: INTERNAL MEDICINE | Admitting: INTERNAL MEDICINE

## 2018-04-13 DIAGNOSIS — C50.911 MALIGNANT NEOPLASM OF RIGHT FEMALE BREAST (HCC): ICD-10-CM

## 2018-04-13 LAB
GLUCOSE BLD-MCNC: 107 MG/DL (ref 70–99)
GLUCOSE BLD-MCNC: 93 MG/DL (ref 70–99)
PERFORMED ON: ABNORMAL
PERFORMED ON: NORMAL

## 2018-04-17 RX ORDER — GLIMEPIRIDE 4 MG/1
TABLET ORAL
Qty: 30 TABLET | Refills: 2 | Status: SHIPPED | OUTPATIENT
Start: 2018-04-17 | End: 2018-07-22 | Stop reason: SDUPTHER

## 2018-07-12 RX ORDER — OXYBUTYNIN CHLORIDE 15 MG/1
TABLET, EXTENDED RELEASE ORAL
Qty: 30 TABLET | Refills: 3 | Status: SHIPPED | OUTPATIENT
Start: 2018-07-12 | End: 2018-11-10 | Stop reason: SDUPTHER

## 2018-07-23 RX ORDER — GLIMEPIRIDE 4 MG/1
TABLET ORAL
Qty: 30 TABLET | Refills: 1 | Status: SHIPPED | OUTPATIENT
Start: 2018-07-23 | End: 2018-09-20 | Stop reason: SDUPTHER

## 2018-08-09 RX ORDER — LINAGLIPTIN 5 MG/1
TABLET, FILM COATED ORAL
Qty: 30 TABLET | Refills: 2 | Status: SHIPPED | OUTPATIENT
Start: 2018-08-09 | End: 2018-11-10 | Stop reason: SDUPTHER

## 2018-08-24 ENCOUNTER — OFFICE VISIT (OUTPATIENT)
Dept: FAMILY MEDICINE CLINIC | Age: 58
End: 2018-08-24

## 2018-08-24 VITALS
SYSTOLIC BLOOD PRESSURE: 150 MMHG | BODY MASS INDEX: 31.48 KG/M2 | HEART RATE: 73 BPM | WEIGHT: 168 LBS | DIASTOLIC BLOOD PRESSURE: 70 MMHG | OXYGEN SATURATION: 98 %

## 2018-08-24 DIAGNOSIS — I10 ESSENTIAL HYPERTENSION: ICD-10-CM

## 2018-08-24 DIAGNOSIS — E78.00 ELEVATED CHOLESTEROL: ICD-10-CM

## 2018-08-24 DIAGNOSIS — E11.9 TYPE 2 DIABETES MELLITUS WITHOUT COMPLICATION, WITHOUT LONG-TERM CURRENT USE OF INSULIN (HCC): Primary | ICD-10-CM

## 2018-08-24 DIAGNOSIS — G89.29 CHRONIC BILATERAL LOW BACK PAIN WITHOUT SCIATICA: ICD-10-CM

## 2018-08-24 DIAGNOSIS — M54.50 CHRONIC BILATERAL LOW BACK PAIN WITHOUT SCIATICA: ICD-10-CM

## 2018-08-24 DIAGNOSIS — E66.9 OBESITY (BMI 30-39.9): ICD-10-CM

## 2018-08-24 PROCEDURE — G8417 CALC BMI ABV UP PARAM F/U: HCPCS | Performed by: FAMILY MEDICINE

## 2018-08-24 PROCEDURE — 3017F COLORECTAL CA SCREEN DOC REV: CPT | Performed by: FAMILY MEDICINE

## 2018-08-24 PROCEDURE — G8428 CUR MEDS NOT DOCUMENT: HCPCS | Performed by: FAMILY MEDICINE

## 2018-08-24 PROCEDURE — 3045F PR MOST RECENT HEMOGLOBIN A1C LEVEL 7.0-9.0%: CPT | Performed by: FAMILY MEDICINE

## 2018-08-24 PROCEDURE — 99214 OFFICE O/P EST MOD 30 MIN: CPT | Performed by: FAMILY MEDICINE

## 2018-08-24 PROCEDURE — 2022F DILAT RTA XM EVC RTNOPTHY: CPT | Performed by: FAMILY MEDICINE

## 2018-08-24 PROCEDURE — 1036F TOBACCO NON-USER: CPT | Performed by: FAMILY MEDICINE

## 2018-08-24 RX ORDER — LISINOPRIL 10 MG/1
10 TABLET ORAL DAILY
Qty: 30 TABLET | Refills: 3 | Status: SHIPPED | OUTPATIENT
Start: 2018-08-24 | End: 2018-12-25 | Stop reason: SDUPTHER

## 2018-08-24 RX ORDER — TRAMADOL HYDROCHLORIDE 50 MG/1
50 TABLET ORAL EVERY 6 HOURS PRN
Qty: 60 TABLET | Refills: 0 | Status: SHIPPED | OUTPATIENT
Start: 2018-08-24 | End: 2018-11-06 | Stop reason: SDUPTHER

## 2018-08-24 ASSESSMENT — ENCOUNTER SYMPTOMS
DIARRHEA: 0
BACK PAIN: 1
CONSTIPATION: 0
DOUBLE VISION: 0
HEARTBURN: 0
BLURRED VISION: 0
SHORTNESS OF BREATH: 0

## 2018-08-24 NOTE — PROGRESS NOTES
53 12/20/2017    LDLCALC 43 12/20/2017         Social History     Social History    Marital status:      Spouse name: N/A    Number of children: N/A    Years of education: N/A     Occupational History    Not on file. Social History Main Topics    Smoking status: Former Smoker     Packs/day: 1.00     Years: 10.00    Smokeless tobacco: Never Used    Alcohol use 0.0 oz/week      Comment: occasional    Drug use: No    Sexual activity: Not on file     Other Topics Concern    Not on file     Social History Narrative    No narrative on file      Current Outpatient Prescriptions on File Prior to Visit   Medication Sig Dispense Refill    TRADJENTA 5 MG tablet TAKE ONE TABLET BY MOUTH DAILY 30 tablet 2    glimepiride (AMARYL) 4 MG tablet TAKE ONE TABLET BY MOUTH EVERY MORNING BEFORE BREAKFAST 30 tablet 1    oxybutynin (DITROPAN XL) 15 MG extended release tablet TAKE ONE TABLET BY MOUTH DAILY 30 tablet 3    metFORMIN (GLUCOPHAGE XR) 500 MG extended release tablet Take 1 tablet by mouth 2 times daily 120 tablet 3    atorvastatin (LIPITOR) 40 MG tablet TAKE ONE TABLET BY MOUTH DAILY 30 tablet 11    tamoxifen (NOLVADEX) 20 MG tablet TAKE ONE TABLET BY MOUTH DAILY 30 tablet 4    glucose blood VI test strips (ONE TOUCH TEST STRIPS) strip 1 each by In Vitro route 2 times daily 100 each 3    ONE TOUCH LANCETS MISC 1 each by Does not apply route 2 times daily 100 each 3    Blood Glucose Monitoring Suppl (ONE TOUCH ULTRA 2) W/DEVICE KIT 1 kit by Does not apply route daily 1 kit 0    aspirin EC 81 MG EC tablet Take 1 tablet by mouth daily 30 tablet 12     No current facility-administered medications on file prior to visit. Patient remains on tamoxifen for her breast cancer she has had no recurrence      Review of Systems   Constitutional: Negative for malaise/fatigue and weight loss. HENT: Negative for congestion and hearing loss. Eyes: Negative for blurred vision and double vision. for up to 90 days. .    Obesity (BMI 30-39. 9)    Elevated cholesterol  -     LIPID PANEL; Future  -     Comprehensive Metabolic Panel; Future    Essential hypertension    Other orders  -     lisinopril (PRINIVIL;ZESTRIL) 10 MG tablet; Take 1 tablet by mouth daily     Reprinted requisition for her A1c to be performed now, added lisinopril 10 mg a day for blood pressure, she needs eye exam for diabetes. Discussed the importance of monitoring her blood sugars. She said follow-up here in 3 months with complete blood work at that time.   OARRS report accessed and reviewed  Continue on other medication, DASH diet recommended

## 2018-09-20 RX ORDER — GLIMEPIRIDE 4 MG/1
TABLET ORAL
Qty: 30 TABLET | Refills: 0 | Status: SHIPPED | OUTPATIENT
Start: 2018-09-20 | End: 2018-10-17 | Stop reason: SDUPTHER

## 2018-09-21 ENCOUNTER — HOSPITAL ENCOUNTER (OUTPATIENT)
Dept: MAMMOGRAPHY | Age: 58
Discharge: HOME | End: 2018-09-22
Attending: SURGERY | Admitting: SURGERY

## 2018-09-21 ENCOUNTER — OFFICE VISIT (OUTPATIENT)
Dept: SURGERY | Age: 58
End: 2018-09-21

## 2018-09-21 VITALS
WEIGHT: 161.8 LBS | BODY MASS INDEX: 29.77 KG/M2 | HEART RATE: 88 BPM | SYSTOLIC BLOOD PRESSURE: 139 MMHG | HEIGHT: 62 IN | DIASTOLIC BLOOD PRESSURE: 87 MMHG | TEMPERATURE: 97 F

## 2018-09-21 DIAGNOSIS — Z12.31 SCREENING MAMMOGRAM, ENCOUNTER FOR: Primary | ICD-10-CM

## 2018-09-21 DIAGNOSIS — Z85.3 PERSONAL HISTORY OF BREAST CANCER: ICD-10-CM

## 2018-09-21 DIAGNOSIS — Z12.31 SCREENING MAMMOGRAM, ENCOUNTER FOR: ICD-10-CM

## 2018-09-21 PROCEDURE — 99213 OFFICE O/P EST LOW 20 MIN: CPT | Performed by: SURGERY

## 2018-09-21 PROCEDURE — G8417 CALC BMI ABV UP PARAM F/U: HCPCS | Performed by: SURGERY

## 2018-09-21 PROCEDURE — 3017F COLORECTAL CA SCREEN DOC REV: CPT | Performed by: SURGERY

## 2018-09-21 PROCEDURE — 1036F TOBACCO NON-USER: CPT | Performed by: SURGERY

## 2018-09-21 PROCEDURE — G8427 DOCREV CUR MEDS BY ELIG CLIN: HCPCS | Performed by: SURGERY

## 2018-09-21 NOTE — PROGRESS NOTES
[id number] STAGE:  0 right breast cancer      Ms. Vinayak Urrutia is a 65y.o.-year-old woman who initially presented to me with  right breast cancer. Since her postoperative visit Ms. Vinayak Urrutia has been doing quite well. Her adjuvant treatment has included tamoxifen. Her main concern at this time is cosmesis of the right breast.  She would like to explore other options to improve symmetry of the breast and a more natural contour. She is requesting referral to an additional plastic surgeon for second opinions. INTERVAL HISTORY:  On 7/20/2016 she underwent a right mastectomy with sentinel lymph node biopsy and immediate tissue expander based reconstruction. Pathology identified DCIS, grade 2, ER positive, MT positive. There were 0/6 lymph nodes involved with carcinoma. [id number]. Tamoxifen initiated. On 9/21/2018 she underwent left breast screening mammography. There are no concerning findings suggestive of malignancy. BI-RADS 2. Exam:  General: no acute distress  Breast:  The patient was examined in the upright and supine position. Right breast is been removed surgically. There is an implant-based reconstruction in place. There is a well healed scar on the  right breast. There is a similarly well healed ipsilateral axillary scar. Post surgical changes are noted. She has some redundant skin. Her projection is somewhat assymetrical.  She has good range of motion with her arm. Her contralateral breast shows evidence of a symmetry procedure. There are no new palpable masses, there are no skin changes of concern,  There is no nipple discharge.   Respiratory: respirations are non-labored and there is no audible distress  Cardiovascular: regular rate, extremities appear well perfused  Neurologic: alert, oriented      Assessment/Plan:  [id number] STAGE: 0 right breast cancer  ER/MT positive   S/p right mastectomy with SLNB  S/p left excisional biopsy  papilloma  s/p right breast implant based reconstruction  s/p left reduction symmetry procedure    On tamoxifen. There are no current signs of recurrence. I encouraged her to continue self breast evaluation. We reviewed signs and symptoms of concern. She understands to notify the office if she identifies any concerning findings on her self breast evaluation. Follow up surveillance was discussed. We will plan for annual left screening mammography in September 2018 with follow-up clinical exam.    All of the patient's questions were answered at this time however, she was encouraged to call the office with any further inquiries. Approximately 15 minutes of time were spent in this visit of which 50% or more of the time was related to coordination of care.

## 2018-10-17 RX ORDER — GLIMEPIRIDE 4 MG/1
TABLET ORAL
Qty: 30 TABLET | Refills: 0 | Status: SHIPPED | OUTPATIENT
Start: 2018-10-17 | End: 2018-11-13 | Stop reason: SDUPTHER

## 2018-10-29 ENCOUNTER — TELEPHONE (OUTPATIENT)
Dept: SURGERY | Age: 58
End: 2018-10-29

## 2018-10-29 DIAGNOSIS — Z01.812 BLOOD TESTS PRIOR TO TREATMENT OR PROCEDURE: ICD-10-CM

## 2018-10-29 DIAGNOSIS — Z98.82 HISTORY OF RIGHT BREAST IMPLANT: Primary | ICD-10-CM

## 2018-11-01 ENCOUNTER — HOSPITAL ENCOUNTER (OUTPATIENT)
Age: 58
Discharge: HOME | End: 2018-11-01
Payer: COMMERCIAL

## 2018-11-01 DIAGNOSIS — E78.00 ELEVATED CHOLESTEROL: ICD-10-CM

## 2018-11-01 DIAGNOSIS — E11.9 TYPE 2 DIABETES MELLITUS WITHOUT COMPLICATION, WITHOUT LONG-TERM CURRENT USE OF INSULIN (HCC): ICD-10-CM

## 2018-11-01 LAB
A/G RATIO: 1.5 (ref 1.1–2.2)
ALBUMIN SERPL-MCNC: 4.2 G/DL (ref 3.4–5)
ALP BLD-CCNC: 79 U/L (ref 40–129)
ALT SERPL-CCNC: 16 U/L (ref 10–40)
ANION GAP SERPL CALCULATED.3IONS-SCNC: 13 MMOL/L (ref 3–16)
AST SERPL-CCNC: 22 U/L (ref 15–37)
BILIRUB SERPL-MCNC: <0.2 MG/DL (ref 0–1)
BUN BLDV-MCNC: 16 MG/DL (ref 7–20)
CALCIUM SERPL-MCNC: 9.3 MG/DL (ref 8.3–10.6)
CHLORIDE BLD-SCNC: 106 MMOL/L (ref 99–110)
CHOLESTEROL, TOTAL: 138 MG/DL (ref 0–199)
CO2: 24 MMOL/L (ref 21–32)
CREAT SERPL-MCNC: 0.7 MG/DL (ref 0.6–1.1)
CREATININE URINE: 90.7 MG/DL (ref 28–259)
ESTIMATED AVERAGE GLUCOSE: 174.3 MG/DL
GFR AFRICAN AMERICAN: >60
GFR NON-AFRICAN AMERICAN: >60
GLOBULIN: 2.8 G/DL
GLUCOSE BLD-MCNC: 98 MG/DL (ref 70–99)
HBA1C MFR BLD: 7.7 %
HDLC SERPL-MCNC: 44 MG/DL (ref 40–60)
LDL CHOLESTEROL CALCULATED: 75 MG/DL
MICROALBUMIN UR-MCNC: <1.2 MG/DL
MICROALBUMIN/CREAT UR-RTO: NORMAL MG/G (ref 0–30)
POTASSIUM SERPL-SCNC: 4.5 MMOL/L (ref 3.5–5.1)
SODIUM BLD-SCNC: 143 MMOL/L (ref 136–145)
TOTAL PROTEIN: 7 G/DL (ref 6.4–8.2)
TRIGL SERPL-MCNC: 96 MG/DL (ref 0–150)
VLDLC SERPL CALC-MCNC: 19 MG/DL

## 2018-11-01 PROCEDURE — 82570 ASSAY OF URINE CREATININE: CPT

## 2018-11-01 PROCEDURE — 83036 HEMOGLOBIN GLYCOSYLATED A1C: CPT

## 2018-11-01 PROCEDURE — 80053 COMPREHEN METABOLIC PANEL: CPT

## 2018-11-01 PROCEDURE — 82043 UR ALBUMIN QUANTITATIVE: CPT

## 2018-11-01 PROCEDURE — 36415 COLL VENOUS BLD VENIPUNCTURE: CPT

## 2018-11-01 PROCEDURE — 80061 LIPID PANEL: CPT

## 2018-11-05 ENCOUNTER — HOSPITAL ENCOUNTER (OUTPATIENT)
Dept: MRI IMAGING | Age: 58
Discharge: HOME | End: 2018-11-05
Payer: COMMERCIAL

## 2018-11-05 ENCOUNTER — TELEPHONE (OUTPATIENT)
Dept: SURGERY | Age: 58
End: 2018-11-05

## 2018-11-05 DIAGNOSIS — Z98.82 HISTORY OF RIGHT BREAST IMPLANT: ICD-10-CM

## 2018-11-05 PROCEDURE — 6360000004 HC RX CONTRAST MEDICATION: Performed by: SURGERY

## 2018-11-05 PROCEDURE — A9579 GAD-BASE MR CONTRAST NOS,1ML: HCPCS | Performed by: SURGERY

## 2018-11-05 PROCEDURE — 2580000003 HC RX 258: Performed by: SURGERY

## 2018-11-05 PROCEDURE — C8908 MRI W/O FOL W/CONT, BREAST,: HCPCS

## 2018-11-05 RX ORDER — SODIUM CHLORIDE 9 MG/ML
INJECTION, SOLUTION INTRAVENOUS ONCE
Status: COMPLETED | OUTPATIENT
Start: 2018-11-05 | End: 2018-11-05

## 2018-11-05 RX ADMIN — SODIUM CHLORIDE: 9 INJECTION, SOLUTION INTRAVENOUS at 13:48

## 2018-11-05 RX ADMIN — GADOTERIDOL 20 ML: 279.3 INJECTION, SOLUTION INTRAVENOUS at 13:48

## 2018-11-05 NOTE — TELEPHONE ENCOUNTER
Spoke with . Mayda Martinez regarding MRI results of breast. It is noted that there is a question as to if she has a rib fracture or contusion. She stated she has an appointment with her PCP in the morning at 10 AM. I told her to make sure she informs him of her pain and let him know mri results are in [address] [place] [address] Rd. Patient verbalized understanding.

## 2018-11-06 ENCOUNTER — OFFICE VISIT (OUTPATIENT)
Dept: FAMILY MEDICINE CLINIC | Age: 58
End: 2018-11-06
Payer: COMMERCIAL

## 2018-11-06 VITALS
BODY MASS INDEX: 29.63 KG/M2 | HEART RATE: 84 BPM | SYSTOLIC BLOOD PRESSURE: 128 MMHG | DIASTOLIC BLOOD PRESSURE: 74 MMHG | WEIGHT: 162 LBS | OXYGEN SATURATION: 97 %

## 2018-11-06 DIAGNOSIS — H47.20 LEFT OPTIC ATROPHY: ICD-10-CM

## 2018-11-06 DIAGNOSIS — G56.03 BILATERAL CARPAL TUNNEL SYNDROME: ICD-10-CM

## 2018-11-06 DIAGNOSIS — E11.9 TYPE 2 DIABETES MELLITUS WITHOUT COMPLICATION, WITHOUT LONG-TERM CURRENT USE OF INSULIN (HCC): Primary | ICD-10-CM

## 2018-11-06 DIAGNOSIS — G89.29 CHRONIC BILATERAL LOW BACK PAIN WITHOUT SCIATICA: ICD-10-CM

## 2018-11-06 DIAGNOSIS — E78.00 ELEVATED CHOLESTEROL: ICD-10-CM

## 2018-11-06 DIAGNOSIS — M54.50 CHRONIC BILATERAL LOW BACK PAIN WITHOUT SCIATICA: ICD-10-CM

## 2018-11-06 DIAGNOSIS — H53.452 DECREASED PERIPHERAL VISION OF LEFT EYE: ICD-10-CM

## 2018-11-06 PROCEDURE — 99214 OFFICE O/P EST MOD 30 MIN: CPT | Performed by: FAMILY MEDICINE

## 2018-11-06 PROCEDURE — 3045F PR MOST RECENT HEMOGLOBIN A1C LEVEL 7.0-9.0%: CPT | Performed by: FAMILY MEDICINE

## 2018-11-06 PROCEDURE — G8427 DOCREV CUR MEDS BY ELIG CLIN: HCPCS | Performed by: FAMILY MEDICINE

## 2018-11-06 PROCEDURE — G8482 FLU IMMUNIZE ORDER/ADMIN: HCPCS | Performed by: FAMILY MEDICINE

## 2018-11-06 PROCEDURE — 1036F TOBACCO NON-USER: CPT | Performed by: FAMILY MEDICINE

## 2018-11-06 PROCEDURE — 90471 IMMUNIZATION ADMIN: CPT | Performed by: FAMILY MEDICINE

## 2018-11-06 PROCEDURE — 3017F COLORECTAL CA SCREEN DOC REV: CPT | Performed by: FAMILY MEDICINE

## 2018-11-06 PROCEDURE — 2022F DILAT RTA XM EVC RTNOPTHY: CPT | Performed by: FAMILY MEDICINE

## 2018-11-06 PROCEDURE — 90682 RIV4 VACC RECOMBINANT DNA IM: CPT | Performed by: FAMILY MEDICINE

## 2018-11-06 PROCEDURE — 93880 EXTRACRANIAL BILAT STUDY: CPT | Performed by: FAMILY MEDICINE

## 2018-11-06 PROCEDURE — G8417 CALC BMI ABV UP PARAM F/U: HCPCS | Performed by: FAMILY MEDICINE

## 2018-11-06 RX ORDER — TRAMADOL HYDROCHLORIDE 50 MG/1
50 TABLET ORAL EVERY 6 HOURS PRN
Qty: 60 TABLET | Refills: 0 | Status: SHIPPED | OUTPATIENT
Start: 2018-11-06 | End: 2019-08-30 | Stop reason: SDUPTHER

## 2018-11-06 ASSESSMENT — ENCOUNTER SYMPTOMS
NAUSEA: 0
TROUBLE SWALLOWING: 0
ABDOMINAL PAIN: 0
BACK PAIN: 1
CONSTIPATION: 0
SHORTNESS OF BREATH: 0
COUGH: 0

## 2018-11-06 ASSESSMENT — PATIENT HEALTH QUESTIONNAIRE - PHQ9
SUM OF ALL RESPONSES TO PHQ QUESTIONS 1-9: 0
SUM OF ALL RESPONSES TO PHQ QUESTIONS 1-9: 0
2. FEELING DOWN, DEPRESSED OR HOPELESS: 0
SUM OF ALL RESPONSES TO PHQ9 QUESTIONS 1 & 2: 0
1. LITTLE INTEREST OR PLEASURE IN DOING THINGS: 0

## 2018-11-06 NOTE — PROGRESS NOTES
Vaccine Information Sheet, \"Influenza - Inactivated\"  given to General Crafts, or parent/legal guardian of  General Tagasauris and verbalized understanding. Patient responses:    Have you ever had a reaction to a flu vaccine? No  Are you able to eat eggs without adverse effects? Yes  Do you have any current illness? No  Have you ever had Guillian Linwood Syndrome? No    Flu vaccine given per order. Please see immunization tab.

## 2018-11-12 RX ORDER — LINAGLIPTIN 5 MG/1
TABLET, FILM COATED ORAL
Qty: 30 TABLET | Refills: 5 | Status: SHIPPED | OUTPATIENT
Start: 2018-11-12 | End: 2019-05-14 | Stop reason: SDUPTHER

## 2018-11-12 RX ORDER — OXYBUTYNIN CHLORIDE 15 MG/1
TABLET, EXTENDED RELEASE ORAL
Qty: 30 TABLET | Refills: 5 | Status: SHIPPED | OUTPATIENT
Start: 2018-11-12 | End: 2019-08-30 | Stop reason: SDUPTHER

## 2018-11-13 RX ORDER — GLIMEPIRIDE 4 MG/1
TABLET ORAL
Qty: 30 TABLET | Refills: 2 | Status: SHIPPED | OUTPATIENT
Start: 2018-11-13 | End: 2019-02-13 | Stop reason: SDUPTHER

## 2018-12-06 ENCOUNTER — OFFICE VISIT (OUTPATIENT)
Dept: FAMILY MEDICINE CLINIC | Age: 58
End: 2018-12-06
Payer: COMMERCIAL

## 2018-12-06 ENCOUNTER — TELEPHONE (OUTPATIENT)
Dept: FAMILY MEDICINE CLINIC | Age: 58
End: 2018-12-06

## 2018-12-06 VITALS
TEMPERATURE: 99.7 F | WEIGHT: 161 LBS | SYSTOLIC BLOOD PRESSURE: 120 MMHG | DIASTOLIC BLOOD PRESSURE: 78 MMHG | BODY MASS INDEX: 29.45 KG/M2

## 2018-12-06 DIAGNOSIS — J11.1 INFLUENZA: Primary | ICD-10-CM

## 2018-12-06 LAB
INFLUENZA A ANTIBODY: NEGATIVE
INFLUENZA B ANTIBODY: NEGATIVE

## 2018-12-06 PROCEDURE — 3017F COLORECTAL CA SCREEN DOC REV: CPT | Performed by: PHYSICIAN ASSISTANT

## 2018-12-06 PROCEDURE — 99213 OFFICE O/P EST LOW 20 MIN: CPT | Performed by: PHYSICIAN ASSISTANT

## 2018-12-06 PROCEDURE — G8417 CALC BMI ABV UP PARAM F/U: HCPCS | Performed by: PHYSICIAN ASSISTANT

## 2018-12-06 PROCEDURE — G8427 DOCREV CUR MEDS BY ELIG CLIN: HCPCS | Performed by: PHYSICIAN ASSISTANT

## 2018-12-06 PROCEDURE — G8482 FLU IMMUNIZE ORDER/ADMIN: HCPCS | Performed by: PHYSICIAN ASSISTANT

## 2018-12-06 PROCEDURE — 1036F TOBACCO NON-USER: CPT | Performed by: PHYSICIAN ASSISTANT

## 2018-12-06 PROCEDURE — 87804 INFLUENZA ASSAY W/OPTIC: CPT | Performed by: PHYSICIAN ASSISTANT

## 2018-12-06 RX ORDER — OSELTAMIVIR PHOSPHATE 75 MG/1
75 CAPSULE ORAL 2 TIMES DAILY
Qty: 10 CAPSULE | Refills: 0 | Status: SHIPPED | OUTPATIENT
Start: 2018-12-06 | End: 2018-12-11

## 2018-12-06 RX ORDER — GUAIFENESIN AND CODEINE PHOSPHATE 100; 10 MG/5ML; MG/5ML
5 SOLUTION ORAL EVERY 4 HOURS PRN
Qty: 120 ML | Refills: 0 | OUTPATIENT
Start: 2018-12-06 | End: 2018-12-09

## 2018-12-06 ASSESSMENT — ENCOUNTER SYMPTOMS
NAUSEA: 0
COUGH: 1
RHINORRHEA: 1
VOMITING: 0
EYE PAIN: 0
SORE THROAT: 1
WHEEZING: 0
DIARRHEA: 0
SHORTNESS OF BREATH: 0

## 2018-12-21 ENCOUNTER — PROCEDURE VISIT (OUTPATIENT)
Dept: NEUROLOGY | Age: 58
End: 2018-12-21
Payer: COMMERCIAL

## 2018-12-21 DIAGNOSIS — G56.03 BILATERAL CARPAL TUNNEL SYNDROME: Primary | ICD-10-CM

## 2018-12-21 PROCEDURE — 95911 NRV CNDJ TEST 9-10 STUDIES: CPT | Performed by: PSYCHIATRY & NEUROLOGY

## 2018-12-21 PROCEDURE — 95886 MUSC TEST DONE W/N TEST COMP: CPT | Performed by: PSYCHIATRY & NEUROLOGY

## 2018-12-21 NOTE — PROGRESS NOTES
Jose Carlos Montes M.D. [facility]) [facility]  Board Certified in [address] [facility] [address] [place] [address], [address] [facility], [address] [facility]    EMG / NERVE CONDUCTION STUDY    PATIENT:     Veda Mcclain      DATE OF EM2018    YOB: 1960       REASON FOR EMG:    Tingling and numbness in both hands    REFERRING PHYSICIAN:  Herman Squires MD  [address], [address]    SUMMARY:  Bilateral median motor nerve studies had prolonged distal latencies. Bilateral median sensory nerve studies had prolonged distal latencies. Bilateral ulnar motor and sensory nerve studies were normal.  The right radial sensory nerve study was normal.  Needle EMG of several muscles in both upper extremities was normal.     CLINICAL DIAGNOSIS:  Carpal tunnel syndrome     EMG RESULTS:   This patient has moderately severe bilateral median nerve lesions at the wrist.  (Carpal tunnel syndrome). The right side is more severely involved when compared to the left side. _____________________________  Jose Carlos Montes M.D.   Electromyographer/Neurologist

## 2018-12-26 DIAGNOSIS — G56.03 BILATERAL CARPAL TUNNEL SYNDROME: Primary | ICD-10-CM

## 2018-12-26 RX ORDER — LISINOPRIL 10 MG/1
TABLET ORAL
Qty: 30 TABLET | Refills: 10 | Status: SHIPPED | OUTPATIENT
Start: 2018-12-26 | End: 2019-08-30 | Stop reason: SDUPTHER

## 2019-01-11 ENCOUNTER — OFFICE VISIT (OUTPATIENT)
Dept: ORTHOPEDIC SURGERY | Age: 59
End: 2019-01-11
Payer: COMMERCIAL

## 2019-01-11 VITALS
HEART RATE: 81 BPM | WEIGHT: 161 LBS | DIASTOLIC BLOOD PRESSURE: 86 MMHG | BODY MASS INDEX: 29.63 KG/M2 | SYSTOLIC BLOOD PRESSURE: 155 MMHG | HEIGHT: 62 IN

## 2019-01-11 DIAGNOSIS — G56.01 CARPAL TUNNEL SYNDROME ON RIGHT: Primary | ICD-10-CM

## 2019-01-11 DIAGNOSIS — G56.03 BILATERAL CARPAL TUNNEL SYNDROME: ICD-10-CM

## 2019-01-11 PROCEDURE — G8482 FLU IMMUNIZE ORDER/ADMIN: HCPCS | Performed by: PHYSICIAN ASSISTANT

## 2019-01-11 PROCEDURE — 1036F TOBACCO NON-USER: CPT | Performed by: PHYSICIAN ASSISTANT

## 2019-01-11 PROCEDURE — G8417 CALC BMI ABV UP PARAM F/U: HCPCS | Performed by: PHYSICIAN ASSISTANT

## 2019-01-11 PROCEDURE — 99203 OFFICE O/P NEW LOW 30 MIN: CPT | Performed by: PHYSICIAN ASSISTANT

## 2019-01-11 PROCEDURE — L3908 WHO COCK-UP NONMOLDE PRE OTS: HCPCS | Performed by: ORTHOPAEDIC SURGERY

## 2019-01-11 PROCEDURE — 3017F COLORECTAL CA SCREEN DOC REV: CPT | Performed by: PHYSICIAN ASSISTANT

## 2019-01-11 PROCEDURE — G8427 DOCREV CUR MEDS BY ELIG CLIN: HCPCS | Performed by: PHYSICIAN ASSISTANT

## 2019-01-15 ENCOUNTER — OFFICE VISIT (OUTPATIENT)
Dept: FAMILY MEDICINE CLINIC | Age: 59
End: 2019-01-15
Payer: COMMERCIAL

## 2019-01-15 VITALS
HEART RATE: 90 BPM | TEMPERATURE: 99 F | OXYGEN SATURATION: 98 % | DIASTOLIC BLOOD PRESSURE: 80 MMHG | WEIGHT: 166.6 LBS | RESPIRATION RATE: 20 BRPM | BODY MASS INDEX: 31.45 KG/M2 | HEIGHT: 61 IN | SYSTOLIC BLOOD PRESSURE: 134 MMHG

## 2019-01-15 DIAGNOSIS — E11.9 TYPE 2 DIABETES MELLITUS WITHOUT COMPLICATION, WITHOUT LONG-TERM CURRENT USE OF INSULIN (HCC): ICD-10-CM

## 2019-01-15 DIAGNOSIS — R07.9 CHEST PAIN, UNSPECIFIED TYPE: Primary | ICD-10-CM

## 2019-01-15 DIAGNOSIS — R01.1 MURMUR, CARDIAC: ICD-10-CM

## 2019-01-15 PROCEDURE — 99241 PR OFFICE CONSULTATION NEW/ESTAB PATIENT 15 MIN: CPT | Performed by: INTERNAL MEDICINE

## 2019-01-15 PROCEDURE — 3017F COLORECTAL CA SCREEN DOC REV: CPT | Performed by: INTERNAL MEDICINE

## 2019-01-15 PROCEDURE — G8482 FLU IMMUNIZE ORDER/ADMIN: HCPCS | Performed by: INTERNAL MEDICINE

## 2019-01-15 PROCEDURE — 93000 ELECTROCARDIOGRAM COMPLETE: CPT | Performed by: INTERNAL MEDICINE

## 2019-01-15 PROCEDURE — G8417 CALC BMI ABV UP PARAM F/U: HCPCS | Performed by: INTERNAL MEDICINE

## 2019-01-15 PROCEDURE — G8427 DOCREV CUR MEDS BY ELIG CLIN: HCPCS | Performed by: INTERNAL MEDICINE

## 2019-01-15 PROCEDURE — 2022F DILAT RTA XM EVC RTNOPTHY: CPT | Performed by: INTERNAL MEDICINE

## 2019-01-21 ENCOUNTER — ANESTHESIA EVENT (OUTPATIENT)
Dept: OPERATING ROOM | Age: 59
End: 2019-01-21
Payer: COMMERCIAL

## 2019-01-23 ENCOUNTER — TELEPHONE (OUTPATIENT)
Dept: ORTHOPEDIC SURGERY | Age: 59
End: 2019-01-23

## 2019-01-23 RX ORDER — ATORVASTATIN CALCIUM 40 MG/1
TABLET, FILM COATED ORAL
Qty: 30 TABLET | Refills: 0 | Status: SHIPPED | OUTPATIENT
Start: 2019-01-23 | End: 2019-02-20 | Stop reason: SDUPTHER

## 2019-01-24 ENCOUNTER — HOSPITAL ENCOUNTER (OUTPATIENT)
Age: 59
Setting detail: OUTPATIENT SURGERY
Discharge: HOME | End: 2019-01-24
Attending: ORTHOPAEDIC SURGERY | Admitting: ORTHOPAEDIC SURGERY
Payer: COMMERCIAL

## 2019-01-24 ENCOUNTER — ANESTHESIA (OUTPATIENT)
Dept: OPERATING ROOM | Age: 59
End: 2019-01-24
Payer: COMMERCIAL

## 2019-01-24 VITALS
SYSTOLIC BLOOD PRESSURE: 122 MMHG | OXYGEN SATURATION: 94 % | RESPIRATION RATE: 1 BRPM | DIASTOLIC BLOOD PRESSURE: 59 MMHG

## 2019-01-24 VITALS
OXYGEN SATURATION: 100 % | HEART RATE: 69 BPM | BODY MASS INDEX: 30.08 KG/M2 | HEIGHT: 62 IN | DIASTOLIC BLOOD PRESSURE: 70 MMHG | RESPIRATION RATE: 18 BRPM | WEIGHT: 163.47 LBS | SYSTOLIC BLOOD PRESSURE: 104 MMHG | TEMPERATURE: 97 F

## 2019-01-24 LAB
GLUCOSE BLD-MCNC: 101 MG/DL (ref 70–99)
GLUCOSE BLD-MCNC: 96 MG/DL (ref 70–99)
PERFORMED ON: ABNORMAL
PERFORMED ON: NORMAL

## 2019-01-24 PROCEDURE — 3700000000 HC ANESTHESIA ATTENDED CARE: Performed by: ORTHOPAEDIC SURGERY

## 2019-01-24 PROCEDURE — 2500000003 HC RX 250 WO HCPCS: Performed by: ORTHOPAEDIC SURGERY

## 2019-01-24 PROCEDURE — 6360000002 HC RX W HCPCS: Performed by: NURSE ANESTHETIST, CERTIFIED REGISTERED

## 2019-01-24 PROCEDURE — 3600000015 HC SURGERY LEVEL 5 ADDTL 15MIN: Performed by: ORTHOPAEDIC SURGERY

## 2019-01-24 PROCEDURE — 2709999900 HC NON-CHARGEABLE SUPPLY: Performed by: ORTHOPAEDIC SURGERY

## 2019-01-24 PROCEDURE — 2500000003 HC RX 250 WO HCPCS: Performed by: NURSE ANESTHETIST, CERTIFIED REGISTERED

## 2019-01-24 PROCEDURE — 7100000011 HC PHASE II RECOVERY - ADDTL 15 MIN: Performed by: ORTHOPAEDIC SURGERY

## 2019-01-24 PROCEDURE — 7100000000 HC PACU RECOVERY - FIRST 15 MIN: Performed by: ORTHOPAEDIC SURGERY

## 2019-01-24 PROCEDURE — 7100000001 HC PACU RECOVERY - ADDTL 15 MIN: Performed by: ORTHOPAEDIC SURGERY

## 2019-01-24 PROCEDURE — 2580000003 HC RX 258: Performed by: ANESTHESIOLOGY

## 2019-01-24 PROCEDURE — 7100000010 HC PHASE II RECOVERY - FIRST 15 MIN: Performed by: ORTHOPAEDIC SURGERY

## 2019-01-24 PROCEDURE — 3600000005 HC SURGERY LEVEL 5 BASE: Performed by: ORTHOPAEDIC SURGERY

## 2019-01-24 PROCEDURE — 3700000001 HC ADD 15 MINUTES (ANESTHESIA): Performed by: ORTHOPAEDIC SURGERY

## 2019-01-24 RX ORDER — ESMOLOL HYDROCHLORIDE 10 MG/ML
INJECTION INTRAVENOUS PRN
Status: DISCONTINUED | OUTPATIENT
Start: 2019-01-24 | End: 2019-01-24 | Stop reason: SDUPTHER

## 2019-01-24 RX ORDER — SODIUM CHLORIDE 0.9 % (FLUSH) 0.9 %
10 SYRINGE (ML) INJECTION PRN
Status: DISCONTINUED | OUTPATIENT
Start: 2019-01-24 | End: 2019-01-24 | Stop reason: HOSPADM

## 2019-01-24 RX ORDER — SODIUM CHLORIDE 9 MG/ML
INJECTION, SOLUTION INTRAVENOUS CONTINUOUS
Status: DISCONTINUED | OUTPATIENT
Start: 2019-01-24 | End: 2019-01-24 | Stop reason: HOSPADM

## 2019-01-24 RX ORDER — SODIUM CHLORIDE 0.9 % (FLUSH) 0.9 %
10 SYRINGE (ML) INJECTION EVERY 12 HOURS SCHEDULED
Status: DISCONTINUED | OUTPATIENT
Start: 2019-01-24 | End: 2019-01-24 | Stop reason: HOSPADM

## 2019-01-24 RX ORDER — PROPOFOL 10 MG/ML
INJECTION, EMULSION INTRAVENOUS PRN
Status: DISCONTINUED | OUTPATIENT
Start: 2019-01-24 | End: 2019-01-24 | Stop reason: SDUPTHER

## 2019-01-24 RX ORDER — PROPOFOL 10 MG/ML
INJECTION, EMULSION INTRAVENOUS CONTINUOUS PRN
Status: DISCONTINUED | OUTPATIENT
Start: 2019-01-24 | End: 2019-01-24 | Stop reason: SDUPTHER

## 2019-01-24 RX ORDER — LIDOCAINE HYDROCHLORIDE 20 MG/ML
INJECTION, SOLUTION INFILTRATION; PERINEURAL PRN
Status: DISCONTINUED | OUTPATIENT
Start: 2019-01-24 | End: 2019-01-24 | Stop reason: SDUPTHER

## 2019-01-24 RX ADMIN — PROPOFOL 200 MCG/KG/MIN: 10 INJECTION, EMULSION INTRAVENOUS at 12:53

## 2019-01-24 RX ADMIN — LIDOCAINE HYDROCHLORIDE 50 MG: 20 INJECTION, SOLUTION INFILTRATION; PERINEURAL at 12:53

## 2019-01-24 RX ADMIN — SODIUM CHLORIDE: 9 INJECTION, SOLUTION INTRAVENOUS at 11:39

## 2019-01-24 RX ADMIN — ESMOLOL HYDROCHLORIDE 20 MG: 100 INJECTION, SOLUTION INTRAVENOUS at 12:59

## 2019-01-24 RX ADMIN — PROPOFOL 100 MG: 10 INJECTION, EMULSION INTRAVENOUS at 12:53

## 2019-01-24 ASSESSMENT — LIFESTYLE VARIABLES: SMOKING_STATUS: 0

## 2019-01-24 ASSESSMENT — PULMONARY FUNCTION TESTS
PIF_VALUE: 0
PIF_VALUE: 0
PIF_VALUE: 1
PIF_VALUE: 0
PIF_VALUE: 1
PIF_VALUE: 0

## 2019-01-24 ASSESSMENT — PAIN SCALES - GENERAL
PAINLEVEL_OUTOF10: 0

## 2019-01-24 ASSESSMENT — PAIN DESCRIPTION - DESCRIPTORS: DESCRIPTORS: NUMBNESS;PINS AND NEEDLES

## 2019-01-24 ASSESSMENT — PAIN - FUNCTIONAL ASSESSMENT: PAIN_FUNCTIONAL_ASSESSMENT: 0-10

## 2019-02-01 ENCOUNTER — OFFICE VISIT (OUTPATIENT)
Dept: ORTHOPEDIC SURGERY | Age: 59
End: 2019-02-01

## 2019-02-01 VITALS — WEIGHT: 163 LBS | BODY MASS INDEX: 30 KG/M2 | HEIGHT: 62 IN | RESPIRATION RATE: 16 BRPM

## 2019-02-01 DIAGNOSIS — G56.03 BILATERAL CARPAL TUNNEL SYNDROME: ICD-10-CM

## 2019-02-01 PROCEDURE — APPSS15 APP SPLIT SHARED TIME 0-15 MINUTES: Performed by: PHYSICIAN ASSISTANT

## 2019-02-01 PROCEDURE — 99024 POSTOP FOLLOW-UP VISIT: CPT | Performed by: PHYSICIAN ASSISTANT

## 2019-02-14 ENCOUNTER — HOSPITAL ENCOUNTER (OUTPATIENT)
Dept: NON INVASIVE DIAGNOSTICS | Age: 59
Discharge: HOME | End: 2019-02-14
Payer: COMMERCIAL

## 2019-02-14 DIAGNOSIS — R01.1 MURMUR, CARDIAC: ICD-10-CM

## 2019-02-14 LAB
LV EF: 68 %
LVEF MODALITY: NORMAL

## 2019-02-14 PROCEDURE — 93306 TTE W/DOPPLER COMPLETE: CPT

## 2019-02-14 RX ORDER — GLIMEPIRIDE 4 MG/1
TABLET ORAL
Qty: 30 TABLET | Refills: 1 | Status: SHIPPED | OUTPATIENT
Start: 2019-02-14 | End: 2019-03-26

## 2019-02-19 ENCOUNTER — OFFICE VISIT (OUTPATIENT)
Dept: FAMILY MEDICINE CLINIC | Age: 59
End: 2019-02-19
Payer: COMMERCIAL

## 2019-02-19 VITALS
BODY MASS INDEX: 30.36 KG/M2 | SYSTOLIC BLOOD PRESSURE: 136 MMHG | HEART RATE: 92 BPM | DIASTOLIC BLOOD PRESSURE: 84 MMHG | WEIGHT: 166 LBS | OXYGEN SATURATION: 98 %

## 2019-02-19 DIAGNOSIS — M54.50 CHRONIC MIDLINE LOW BACK PAIN WITHOUT SCIATICA: Primary | ICD-10-CM

## 2019-02-19 DIAGNOSIS — E11.9 TYPE 2 DIABETES MELLITUS WITHOUT COMPLICATION, WITHOUT LONG-TERM CURRENT USE OF INSULIN (HCC): ICD-10-CM

## 2019-02-19 DIAGNOSIS — G89.29 CHRONIC MIDLINE LOW BACK PAIN WITHOUT SCIATICA: Primary | ICD-10-CM

## 2019-02-19 DIAGNOSIS — F51.02 ADJUSTMENT INSOMNIA: ICD-10-CM

## 2019-02-19 DIAGNOSIS — I10 ESSENTIAL HYPERTENSION: ICD-10-CM

## 2019-02-19 PROCEDURE — G8427 DOCREV CUR MEDS BY ELIG CLIN: HCPCS | Performed by: INTERNAL MEDICINE

## 2019-02-19 PROCEDURE — 99214 OFFICE O/P EST MOD 30 MIN: CPT | Performed by: INTERNAL MEDICINE

## 2019-02-19 PROCEDURE — 1036F TOBACCO NON-USER: CPT | Performed by: INTERNAL MEDICINE

## 2019-02-19 PROCEDURE — G8417 CALC BMI ABV UP PARAM F/U: HCPCS | Performed by: INTERNAL MEDICINE

## 2019-02-19 PROCEDURE — G8482 FLU IMMUNIZE ORDER/ADMIN: HCPCS | Performed by: INTERNAL MEDICINE

## 2019-02-19 PROCEDURE — 3046F HEMOGLOBIN A1C LEVEL >9.0%: CPT | Performed by: INTERNAL MEDICINE

## 2019-02-19 PROCEDURE — 3017F COLORECTAL CA SCREEN DOC REV: CPT | Performed by: INTERNAL MEDICINE

## 2019-02-19 PROCEDURE — 2022F DILAT RTA XM EVC RTNOPTHY: CPT | Performed by: INTERNAL MEDICINE

## 2019-02-19 ASSESSMENT — PATIENT HEALTH QUESTIONNAIRE - PHQ9
SUM OF ALL RESPONSES TO PHQ QUESTIONS 1-9: 0
SUM OF ALL RESPONSES TO PHQ QUESTIONS 1-9: 0
2. FEELING DOWN, DEPRESSED OR HOPELESS: 0
1. LITTLE INTEREST OR PLEASURE IN DOING THINGS: 0
SUM OF ALL RESPONSES TO PHQ9 QUESTIONS 1 & 2: 0

## 2019-02-19 ASSESSMENT — ENCOUNTER SYMPTOMS
SHORTNESS OF BREATH: 0
COUGH: 0
BACK PAIN: 1

## 2019-02-21 RX ORDER — ATORVASTATIN CALCIUM 40 MG/1
TABLET, FILM COATED ORAL
Qty: 30 TABLET | Refills: 5 | Status: SHIPPED | OUTPATIENT
Start: 2019-02-21 | End: 2019-08-30 | Stop reason: SDUPTHER

## 2019-03-19 RX ORDER — METFORMIN HYDROCHLORIDE 500 MG/1
TABLET, EXTENDED RELEASE ORAL
Qty: 120 TABLET | Refills: 5 | Status: SHIPPED | OUTPATIENT
Start: 2019-03-19 | End: 2019-08-30 | Stop reason: SDUPTHER

## 2019-03-25 ENCOUNTER — HOSPITAL ENCOUNTER (OUTPATIENT)
Age: 59
Discharge: HOME | End: 2019-03-25
Payer: COMMERCIAL

## 2019-03-25 ENCOUNTER — HOSPITAL ENCOUNTER (OUTPATIENT)
Dept: GENERAL RADIOLOGY | Age: 59
Discharge: HOME | End: 2019-03-25
Payer: COMMERCIAL

## 2019-03-25 DIAGNOSIS — G89.29 CHRONIC MIDLINE LOW BACK PAIN WITHOUT SCIATICA: ICD-10-CM

## 2019-03-25 DIAGNOSIS — M54.50 CHRONIC MIDLINE LOW BACK PAIN WITHOUT SCIATICA: ICD-10-CM

## 2019-03-25 DIAGNOSIS — E11.9 TYPE 2 DIABETES MELLITUS WITHOUT COMPLICATION, WITHOUT LONG-TERM CURRENT USE OF INSULIN (HCC): ICD-10-CM

## 2019-03-25 LAB
A/G RATIO: 1.4 (ref 1.1–2.2)
ALBUMIN SERPL-MCNC: 4.3 G/DL (ref 3.4–5)
ALP BLD-CCNC: 96 U/L (ref 40–129)
ALT SERPL-CCNC: 16 U/L (ref 10–40)
ANION GAP SERPL CALCULATED.3IONS-SCNC: 15 MMOL/L (ref 3–16)
AST SERPL-CCNC: 16 U/L (ref 15–37)
BILIRUB SERPL-MCNC: <0.2 MG/DL (ref 0–1)
BUN BLDV-MCNC: 12 MG/DL (ref 7–20)
CALCIUM SERPL-MCNC: 9.4 MG/DL (ref 8.3–10.6)
CHLORIDE BLD-SCNC: 104 MMOL/L (ref 99–110)
CHOLESTEROL, TOTAL: 180 MG/DL (ref 0–199)
CO2: 23 MMOL/L (ref 21–32)
CREAT SERPL-MCNC: 0.6 MG/DL (ref 0.6–1.1)
CREATININE URINE: 98.4 MG/DL (ref 28–259)
GFR AFRICAN AMERICAN: >60
GFR NON-AFRICAN AMERICAN: >60
GLOBULIN: 3.1 G/DL
GLUCOSE BLD-MCNC: 115 MG/DL (ref 70–99)
HDLC SERPL-MCNC: 54 MG/DL (ref 40–60)
LDL CHOLESTEROL CALCULATED: 96 MG/DL
MICROALBUMIN UR-MCNC: <1.2 MG/DL
MICROALBUMIN/CREAT UR-RTO: NORMAL MG/G (ref 0–30)
POTASSIUM SERPL-SCNC: 4 MMOL/L (ref 3.5–5.1)
SODIUM BLD-SCNC: 142 MMOL/L (ref 136–145)
TOTAL PROTEIN: 7.4 G/DL (ref 6.4–8.2)
TRIGL SERPL-MCNC: 149 MG/DL (ref 0–150)
VLDLC SERPL CALC-MCNC: 30 MG/DL

## 2019-03-25 PROCEDURE — 72100 X-RAY EXAM L-S SPINE 2/3 VWS: CPT

## 2019-03-25 PROCEDURE — 82043 UR ALBUMIN QUANTITATIVE: CPT

## 2019-03-25 PROCEDURE — 80053 COMPREHEN METABOLIC PANEL: CPT

## 2019-03-25 PROCEDURE — 36415 COLL VENOUS BLD VENIPUNCTURE: CPT

## 2019-03-25 PROCEDURE — 80061 LIPID PANEL: CPT

## 2019-03-25 PROCEDURE — 83036 HEMOGLOBIN GLYCOSYLATED A1C: CPT

## 2019-03-25 PROCEDURE — 82570 ASSAY OF URINE CREATININE: CPT

## 2019-03-26 LAB
ESTIMATED AVERAGE GLUCOSE: 191.5 MG/DL
HBA1C MFR BLD: 8.3 %

## 2019-03-26 RX ORDER — GLIMEPIRIDE 4 MG/1
8 TABLET ORAL
Qty: 180 TABLET | Refills: 3 | Status: SHIPPED | OUTPATIENT
Start: 2019-03-26 | End: 2022-05-03

## 2019-04-13 RX ORDER — GLIMEPIRIDE 4 MG/1
TABLET ORAL
Qty: 30 TABLET | Refills: 0 | OUTPATIENT
Start: 2019-04-13

## 2019-05-29 ENCOUNTER — NURSE TRIAGE (OUTPATIENT)
Dept: OTHER | Facility: CLINIC | Age: 59
End: 2019-05-29

## 2019-05-29 NOTE — TELEPHONE ENCOUNTER
Reason for Disposition   Patient sounds very sick or weak to the triager    Protocols used: FINGER PAIN-ADULT-[place]    Patient called pre-service center [facility]) to schedule appointment, with red flag complaint, transferred to RN access for triage. Patient reports that she has not been taking her diabetes medication as prescribed and unsure what her BG has been running. Patient reports she has started with pain in her left thumb for a few days and today patient reports the left thumb is dark purple in color and she is unable to move it. Writer instructed patient to go to nearest ED for evaluation.

## 2019-06-13 DIAGNOSIS — M25.542 ARTHRALGIA OF BOTH HANDS: Primary | ICD-10-CM

## 2019-06-13 DIAGNOSIS — M25.541 ARTHRALGIA OF BOTH HANDS: Primary | ICD-10-CM

## 2019-06-24 ENCOUNTER — HOSPITAL ENCOUNTER (OUTPATIENT)
Dept: GENERAL RADIOLOGY | Age: 59
Discharge: HOME | End: 2019-06-24
Payer: COMMERCIAL

## 2019-06-24 ENCOUNTER — HOSPITAL ENCOUNTER (OUTPATIENT)
Age: 59
Discharge: HOME | End: 2019-06-24
Payer: COMMERCIAL

## 2019-06-24 DIAGNOSIS — M25.542 ARTHRALGIA OF BOTH HANDS: ICD-10-CM

## 2019-06-24 DIAGNOSIS — M25.541 ARTHRALGIA OF BOTH HANDS: ICD-10-CM

## 2019-06-24 PROCEDURE — 73140 X-RAY EXAM OF FINGER(S): CPT

## 2019-08-30 ENCOUNTER — OFFICE VISIT (OUTPATIENT)
Dept: FAMILY MEDICINE CLINIC | Age: 59
End: 2019-08-30
Payer: COMMERCIAL

## 2019-08-30 VITALS
SYSTOLIC BLOOD PRESSURE: 128 MMHG | BODY MASS INDEX: 29.08 KG/M2 | DIASTOLIC BLOOD PRESSURE: 82 MMHG | HEART RATE: 104 BPM | OXYGEN SATURATION: 98 % | WEIGHT: 159 LBS

## 2019-08-30 DIAGNOSIS — E11.9 DIABETES MELLITUS WITHOUT COMPLICATION (HCC): Primary | ICD-10-CM

## 2019-08-30 DIAGNOSIS — M54.50 CHRONIC BILATERAL LOW BACK PAIN WITHOUT SCIATICA: ICD-10-CM

## 2019-08-30 DIAGNOSIS — I10 ESSENTIAL HYPERTENSION: ICD-10-CM

## 2019-08-30 DIAGNOSIS — E78.49 OTHER HYPERLIPIDEMIA: ICD-10-CM

## 2019-08-30 DIAGNOSIS — R53.82 CHRONIC FATIGUE: ICD-10-CM

## 2019-08-30 DIAGNOSIS — G89.29 CHRONIC BILATERAL LOW BACK PAIN WITHOUT SCIATICA: ICD-10-CM

## 2019-08-30 PROCEDURE — G8427 DOCREV CUR MEDS BY ELIG CLIN: HCPCS | Performed by: INTERNAL MEDICINE

## 2019-08-30 PROCEDURE — 3045F PR MOST RECENT HEMOGLOBIN A1C LEVEL 7.0-9.0%: CPT | Performed by: INTERNAL MEDICINE

## 2019-08-30 PROCEDURE — 1036F TOBACCO NON-USER: CPT | Performed by: INTERNAL MEDICINE

## 2019-08-30 PROCEDURE — G8417 CALC BMI ABV UP PARAM F/U: HCPCS | Performed by: INTERNAL MEDICINE

## 2019-08-30 PROCEDURE — 2022F DILAT RTA XM EVC RTNOPTHY: CPT | Performed by: INTERNAL MEDICINE

## 2019-08-30 PROCEDURE — 99213 OFFICE O/P EST LOW 20 MIN: CPT | Performed by: INTERNAL MEDICINE

## 2019-08-30 PROCEDURE — 3017F COLORECTAL CA SCREEN DOC REV: CPT | Performed by: INTERNAL MEDICINE

## 2019-08-30 RX ORDER — ATORVASTATIN CALCIUM 40 MG/1
TABLET, FILM COATED ORAL
Qty: 30 TABLET | Refills: 5 | Status: SHIPPED | OUTPATIENT
Start: 2019-08-30 | End: 2020-05-27 | Stop reason: SDUPTHER

## 2019-08-30 RX ORDER — METFORMIN HYDROCHLORIDE 500 MG/1
TABLET, EXTENDED RELEASE ORAL
Qty: 120 TABLET | Refills: 5 | Status: SHIPPED | OUTPATIENT
Start: 2019-08-30 | End: 2020-06-17 | Stop reason: ALTCHOICE

## 2019-08-30 RX ORDER — OXYBUTYNIN CHLORIDE 15 MG/1
TABLET, EXTENDED RELEASE ORAL
Qty: 30 TABLET | Refills: 5 | Status: SHIPPED | OUTPATIENT
Start: 2019-08-30 | End: 2020-05-27 | Stop reason: SDUPTHER

## 2019-08-30 RX ORDER — TRAMADOL HYDROCHLORIDE 50 MG/1
50 TABLET ORAL EVERY 6 HOURS PRN
Qty: 60 TABLET | Refills: 0 | Status: SHIPPED | OUTPATIENT
Start: 2019-08-30 | End: 2020-06-17 | Stop reason: SDUPTHER

## 2019-08-30 RX ORDER — LISINOPRIL 10 MG/1
TABLET ORAL
Qty: 30 TABLET | Refills: 10 | Status: SHIPPED | OUTPATIENT
Start: 2019-08-30 | End: 2020-03-17 | Stop reason: SDUPTHER

## 2019-08-30 ASSESSMENT — ENCOUNTER SYMPTOMS
COUGH: 0
SHORTNESS OF BREATH: 0

## 2019-08-30 NOTE — PROGRESS NOTES
change, chills and fever. Respiratory: Negative for cough and shortness of breath. Cardiovascular: Negative for chest pain and palpitations. Musculoskeletal: Negative for arthralgias. Skin: Negative for rash. Neurological: Negative for headaches. Past medical, surgical, family and social history were reviewed and updated with the patient. Objective:    /82 (Site: Left Upper Arm, Position: Sitting, Cuff Size: Medium Adult)   Pulse 104   Wt 159 lb (72.1 kg)   SpO2 98%   BMI 29.08 kg/m²   Weight: 159 lb (72.1 kg)     BP Readings from Last 3 Encounters:   08/30/19 128/82   02/19/19 136/84   01/24/19 (!) 122/59     Wt Readings from Last 3 Encounters:   08/30/19 159 lb (72.1 kg)   02/19/19 166 lb (75.3 kg)   02/01/19 163 lb (73.9 kg)       Physical Exam   Constitutional: She is oriented to person, place, and time. She appears well-developed and well-nourished. No distress. HENT:   Head: Normocephalic and atraumatic. Eyes: Pupils are equal, round, and reactive to light. Conjunctivae and EOM are normal.   Cardiovascular: Normal rate, regular rhythm, normal heart sounds and intact distal pulses. Exam reveals no gallop and no friction rub. No murmur heard. Pulmonary/Chest: Effort normal and breath sounds normal. No respiratory distress. She has no wheezes. She has no rales. She exhibits no tenderness. Neurological: She is alert and oriented to person, place, and time. Skin: Skin is warm and dry. She is not diaphoretic. Psychiatric: She has a normal mood and affect. Her behavior is normal. Judgment and thought content normal.   Vitals reviewed.   Visual inspection:  Deformity/amputation: absent  Skin lesions/pre-ulcerative calluses: absent  Edema: right- negative, left- negative   + b/l onychomycosis    Sensory exam:  Monofilament sensation: normal  (minimum of 5 random plantar locations tested, avoiding callused areas - > 1 area with absence of sensation is + for neuropathy)    Plus

## 2019-08-31 RX ORDER — ATORVASTATIN CALCIUM 40 MG/1
TABLET, FILM COATED ORAL
Qty: 30 TABLET | Refills: 4 | OUTPATIENT
Start: 2019-08-31

## 2019-09-16 ENCOUNTER — TELEPHONE (OUTPATIENT)
Dept: FAMILY MEDICINE CLINIC | Age: 59
End: 2019-09-16

## 2019-09-16 NOTE — PROGRESS NOTES
left excisional biopsy - papilloma  s/p right breast implant based reconstruction  s/p left reduction symmetry procedure    On tamoxifen. There are no current signs of recurrence. I encouraged her to continue self breast evaluation. We reviewed signs and symptoms of concern. She understands to notify the office if she identifies any concerning findings on her self breast evaluation. Follow up surveillance was discussed. We will plan for annual left screening mammography in September 2020 with follow-up clinical exam with our nurse practitioner. All of the patient's questions were answered at this time however, she was encouraged to call the office with any further inquiries. Approximately 15 minutes of time were spent in this visit of which 50% or more of the time was related to coordination of care.
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
## 2019-09-23 ENCOUNTER — OFFICE VISIT (OUTPATIENT)
Dept: SURGERY | Age: 59
End: 2019-09-23
Payer: COMMERCIAL

## 2019-09-23 ENCOUNTER — HOSPITAL ENCOUNTER (OUTPATIENT)
Dept: WOMENS IMAGING | Age: 59
Discharge: HOME | End: 2019-09-23
Payer: COMMERCIAL

## 2019-09-23 VITALS
HEART RATE: 97 BPM | TEMPERATURE: 97 F | BODY MASS INDEX: 29.11 KG/M2 | DIASTOLIC BLOOD PRESSURE: 95 MMHG | HEIGHT: 62 IN | SYSTOLIC BLOOD PRESSURE: 144 MMHG | WEIGHT: 158.2 LBS

## 2019-09-23 DIAGNOSIS — Z08 ENCOUNTER FOR FOLLOW-UP SURVEILLANCE OF BREAST CANCER: Primary | ICD-10-CM

## 2019-09-23 DIAGNOSIS — Z85.3 PERSONAL HISTORY OF BREAST CANCER: ICD-10-CM

## 2019-09-23 DIAGNOSIS — Z12.31 SCREENING MAMMOGRAM, ENCOUNTER FOR: ICD-10-CM

## 2019-09-23 DIAGNOSIS — Z85.3 ENCOUNTER FOR FOLLOW-UP SURVEILLANCE OF BREAST CANCER: Primary | ICD-10-CM

## 2019-09-23 DIAGNOSIS — Z12.39 SCREENING BREAST EXAMINATION: ICD-10-CM

## 2019-09-23 PROCEDURE — 3017F COLORECTAL CA SCREEN DOC REV: CPT | Performed by: SURGERY

## 2019-09-23 PROCEDURE — 99213 OFFICE O/P EST LOW 20 MIN: CPT | Performed by: SURGERY

## 2019-09-23 PROCEDURE — 77063 BREAST TOMOSYNTHESIS BI: CPT

## 2019-09-23 PROCEDURE — G8427 DOCREV CUR MEDS BY ELIG CLIN: HCPCS | Performed by: SURGERY

## 2019-09-23 PROCEDURE — G8417 CALC BMI ABV UP PARAM F/U: HCPCS | Performed by: SURGERY

## 2019-09-23 PROCEDURE — 1036F TOBACCO NON-USER: CPT | Performed by: SURGERY

## 2019-11-11 ENCOUNTER — TELEPHONE (OUTPATIENT)
Dept: RHEUMATOLOGY | Age: 59
End: 2019-11-11

## 2019-12-06 ENCOUNTER — OFFICE VISIT (OUTPATIENT)
Dept: FAMILY MEDICINE CLINIC | Age: 59
End: 2019-12-06
Payer: COMMERCIAL

## 2019-12-06 VITALS
HEART RATE: 83 BPM | SYSTOLIC BLOOD PRESSURE: 142 MMHG | DIASTOLIC BLOOD PRESSURE: 90 MMHG | OXYGEN SATURATION: 98 % | BODY MASS INDEX: 29.7 KG/M2 | WEIGHT: 159.8 LBS

## 2019-12-06 DIAGNOSIS — E78.00 ELEVATED CHOLESTEROL: ICD-10-CM

## 2019-12-06 DIAGNOSIS — H53.452 DECREASED PERIPHERAL VISION OF LEFT EYE: ICD-10-CM

## 2019-12-06 DIAGNOSIS — E11.9 TYPE 2 DIABETES MELLITUS WITHOUT COMPLICATION, WITHOUT LONG-TERM CURRENT USE OF INSULIN (HCC): ICD-10-CM

## 2019-12-06 DIAGNOSIS — E11.9 DIABETES MELLITUS WITHOUT COMPLICATION (HCC): Primary | ICD-10-CM

## 2019-12-06 DIAGNOSIS — I10 ESSENTIAL HYPERTENSION: ICD-10-CM

## 2019-12-06 DIAGNOSIS — R39.15 URINARY URGENCY: ICD-10-CM

## 2019-12-06 LAB
BACTERIA URINE, POC: 0
BILIRUBIN URINE: 0 MG/DL
BLOOD, URINE: POSITIVE
CASTS URINE, POC: 0
CLARITY: ABNORMAL
COLOR: ABNORMAL
CRYSTALS URINE, POC: 0
EPI CELLS URINE, POC: ABNORMAL
GLUCOSE URINE: ABNORMAL
KETONES, URINE: NEGATIVE
LEUKOCYTE EST, POC: NEGATIVE
NITRITE, URINE: NEGATIVE
PH UA: 5 (ref 4.5–8)
PROTEIN UA: NEGATIVE
RBC URINE, POC: 2
SPECIFIC GRAVITY UA: 1.01 (ref 1–1.03)
UROBILINOGEN, URINE: NORMAL
WBC URINE, POC: 0
YEAST URINE, POC: 0

## 2019-12-06 PROCEDURE — 90471 IMMUNIZATION ADMIN: CPT | Performed by: NURSE PRACTITIONER

## 2019-12-06 PROCEDURE — 90686 IIV4 VACC NO PRSV 0.5 ML IM: CPT | Performed by: NURSE PRACTITIONER

## 2019-12-06 PROCEDURE — 99214 OFFICE O/P EST MOD 30 MIN: CPT | Performed by: NURSE PRACTITIONER

## 2019-12-06 PROCEDURE — 81000 URINALYSIS NONAUTO W/SCOPE: CPT | Performed by: NURSE PRACTITIONER

## 2019-12-06 ASSESSMENT — ENCOUNTER SYMPTOMS
EYE REDNESS: 0
DIARRHEA: 0
NAUSEA: 0
SHORTNESS OF BREATH: 0
COUGH: 0
CONSTIPATION: 0
ABDOMINAL PAIN: 0
BACK PAIN: 1
PHOTOPHOBIA: 0

## 2019-12-18 ENCOUNTER — HOSPITAL ENCOUNTER (OUTPATIENT)
Age: 59
Discharge: HOME | End: 2019-12-18
Payer: COMMERCIAL

## 2019-12-18 ENCOUNTER — HOSPITAL ENCOUNTER (OUTPATIENT)
Dept: VASCULAR LAB | Age: 59
Discharge: HOME | End: 2019-12-18
Payer: COMMERCIAL

## 2019-12-18 DIAGNOSIS — E11.9 TYPE 2 DIABETES MELLITUS WITHOUT COMPLICATION, WITHOUT LONG-TERM CURRENT USE OF INSULIN (HCC): ICD-10-CM

## 2019-12-18 DIAGNOSIS — I10 ESSENTIAL HYPERTENSION: ICD-10-CM

## 2019-12-18 DIAGNOSIS — H53.452 DECREASED PERIPHERAL VISION OF LEFT EYE: ICD-10-CM

## 2019-12-18 DIAGNOSIS — E78.00 ELEVATED CHOLESTEROL: ICD-10-CM

## 2019-12-18 LAB
A/G RATIO: 1.3 (ref 1.1–2.2)
ALBUMIN SERPL-MCNC: 4.2 G/DL (ref 3.4–5)
ALP BLD-CCNC: 116 U/L (ref 40–129)
ALT SERPL-CCNC: 24 U/L (ref 10–40)
ANION GAP SERPL CALCULATED.3IONS-SCNC: 12 MMOL/L (ref 3–16)
AST SERPL-CCNC: 26 U/L (ref 15–37)
BASOPHILS ABSOLUTE: 0.1 K/UL (ref 0–0.2)
BASOPHILS RELATIVE PERCENT: 0.9 %
BILIRUB SERPL-MCNC: 0.3 MG/DL (ref 0–1)
BUN BLDV-MCNC: 12 MG/DL (ref 7–20)
CALCIUM SERPL-MCNC: 9.8 MG/DL (ref 8.3–10.6)
CHLORIDE BLD-SCNC: 101 MMOL/L (ref 99–110)
CHOLESTEROL, FASTING: 171 MG/DL (ref 0–199)
CO2: 27 MMOL/L (ref 21–32)
CREAT SERPL-MCNC: 0.7 MG/DL (ref 0.6–1.1)
CREATININE URINE: 46.2 MG/DL (ref 28–259)
EOSINOPHILS ABSOLUTE: 0.1 K/UL (ref 0–0.6)
EOSINOPHILS RELATIVE PERCENT: 1.1 %
GFR AFRICAN AMERICAN: >60
GFR NON-AFRICAN AMERICAN: >60
GLOBULIN: 3.3 G/DL
GLUCOSE FASTING: 115 MG/DL (ref 70–99)
HCT VFR BLD CALC: 44.8 % (ref 36–48)
HDLC SERPL-MCNC: 52 MG/DL (ref 40–60)
HEMOGLOBIN: 14.8 G/DL (ref 12–16)
LDL CHOLESTEROL CALCULATED: 93 MG/DL
LYMPHOCYTES ABSOLUTE: 3.7 K/UL (ref 1–5.1)
LYMPHOCYTES RELATIVE PERCENT: 41.9 %
MCH RBC QN AUTO: 28.5 PG (ref 26–34)
MCHC RBC AUTO-ENTMCNC: 33 G/DL (ref 31–36)
MCV RBC AUTO: 86.2 FL (ref 80–100)
MICROALBUMIN UR-MCNC: <1.2 MG/DL
MICROALBUMIN/CREAT UR-RTO: NORMAL MG/G (ref 0–30)
MONOCYTES ABSOLUTE: 0.6 K/UL (ref 0–1.3)
MONOCYTES RELATIVE PERCENT: 6.8 %
NEUTROPHILS ABSOLUTE: 4.4 K/UL (ref 1.7–7.7)
NEUTROPHILS RELATIVE PERCENT: 49.3 %
PDW BLD-RTO: 13.1 % (ref 12.4–15.4)
PLATELET # BLD: 309 K/UL (ref 135–450)
PMV BLD AUTO: 8.8 FL (ref 5–10.5)
POTASSIUM SERPL-SCNC: 4.4 MMOL/L (ref 3.5–5.1)
RBC # BLD: 5.2 M/UL (ref 4–5.2)
SODIUM BLD-SCNC: 140 MMOL/L (ref 136–145)
TOTAL PROTEIN: 7.5 G/DL (ref 6.4–8.2)
TRIGLYCERIDE, FASTING: 129 MG/DL (ref 0–150)
TSH REFLEX: 1.63 UIU/ML (ref 0.27–4.2)
VLDLC SERPL CALC-MCNC: 26 MG/DL
WBC # BLD: 8.9 K/UL (ref 4–11)

## 2019-12-18 PROCEDURE — 85025 COMPLETE CBC W/AUTO DIFF WBC: CPT

## 2019-12-18 PROCEDURE — 80053 COMPREHEN METABOLIC PANEL: CPT

## 2019-12-18 PROCEDURE — 82570 ASSAY OF URINE CREATININE: CPT

## 2019-12-18 PROCEDURE — 84443 ASSAY THYROID STIM HORMONE: CPT

## 2019-12-18 PROCEDURE — 80061 LIPID PANEL: CPT

## 2019-12-18 PROCEDURE — 36415 COLL VENOUS BLD VENIPUNCTURE: CPT

## 2019-12-18 PROCEDURE — 83036 HEMOGLOBIN GLYCOSYLATED A1C: CPT

## 2019-12-18 PROCEDURE — 93880 EXTRACRANIAL BILAT STUDY: CPT

## 2019-12-18 PROCEDURE — 82043 UR ALBUMIN QUANTITATIVE: CPT

## 2019-12-19 LAB
ESTIMATED AVERAGE GLUCOSE: 303.4 MG/DL
HBA1C MFR BLD: 12.2 %

## 2020-03-13 ENCOUNTER — OFFICE VISIT (OUTPATIENT)
Dept: FAMILY MEDICINE CLINIC | Age: 60
End: 2020-03-13
Payer: COMMERCIAL

## 2020-03-13 VITALS
DIASTOLIC BLOOD PRESSURE: 66 MMHG | WEIGHT: 167.8 LBS | HEART RATE: 78 BPM | OXYGEN SATURATION: 98 % | SYSTOLIC BLOOD PRESSURE: 112 MMHG | BODY MASS INDEX: 31.19 KG/M2

## 2020-03-13 LAB — HBA1C MFR BLD: 9.1 %

## 2020-03-13 PROCEDURE — 83036 HEMOGLOBIN GLYCOSYLATED A1C: CPT | Performed by: NURSE PRACTITIONER

## 2020-03-13 PROCEDURE — 99214 OFFICE O/P EST MOD 30 MIN: CPT | Performed by: NURSE PRACTITIONER

## 2020-03-13 RX ORDER — LEVOFLOXACIN 750 MG/1
750 TABLET ORAL DAILY
Qty: 10 TABLET | Refills: 0 | Status: SHIPPED | OUTPATIENT
Start: 2020-03-13 | End: 2020-06-17 | Stop reason: ALTCHOICE

## 2020-03-13 RX ORDER — GUAIFENESIN AND CODEINE PHOSPHATE 100; 10 MG/5ML; MG/5ML
5 SOLUTION ORAL 2 TIMES DAILY PRN
Qty: 50 ML | Refills: 0 | Status: SHIPPED | OUTPATIENT
Start: 2020-03-13 | End: 2020-03-18

## 2020-03-13 RX ORDER — PREDNISONE 20 MG/1
20 TABLET ORAL 2 TIMES DAILY
Qty: 10 TABLET | Refills: 0 | Status: SHIPPED | OUTPATIENT
Start: 2020-03-13 | End: 2020-03-18

## 2020-03-13 ASSESSMENT — ENCOUNTER SYMPTOMS
CHEST TIGHTNESS: 1
VOMITING: 0
NAUSEA: 0
CONSTIPATION: 0
COUGH: 1
DIARRHEA: 0
RHINORRHEA: 1
SINUS PRESSURE: 0
SINUS PAIN: 0
SHORTNESS OF BREATH: 1
SORE THROAT: 0
WHEEZING: 0

## 2020-03-13 NOTE — PROGRESS NOTES
Salbador Smith  : 1960  Encounter date: 3/13/2020    This yosvany 63 y.o. female who presents with  Chief Complaint   Patient presents with    3 Month Follow-Up     3 month DM follow up. would like to discuss a cough, nasal drainage x week and a half. History of present illness:    HPI Pt is 63year old female for diabetes recheck and hypertension. Pt received labs in 2019, hgbA1c- 12.2. Performed HgbA1c today. Pt reports taking all medication and blood pressure well controlled. Pt also reports productive cough that started 1.5 weeks ago. Pt has taken OTC mucinex with little relief. Pt denies fevers. Pt denies existing respiratory conditions. Current Outpatient Medications on File Prior to Visit   Medication Sig Dispense Refill    atorvastatin (LIPITOR) 40 MG tablet TAKE ONE TABLET BY MOUTH DAILY 30 tablet 5    linagliptin (TRADJENTA) 5 MG tablet TAKE ONE TABLET BY MOUTH DAILY 30 tablet 5    lisinopril (PRINIVIL;ZESTRIL) 10 MG tablet TAKE ONE TABLET BY MOUTH DAILY 30 tablet 10    metFORMIN (GLUCOPHAGE-XR) 500 MG extended release tablet TAKE TWO TABLETS BY MOUTH TWICE A  tablet 5    oxybutynin (DITROPAN XL) 15 MG extended release tablet TAKE ONE TABLET BY MOUTH DAILY 30 tablet 5    traMADol (ULTRAM) 50 MG tablet Take 1 tablet by mouth every 6 hours as needed for Pain for up to 297 days. 60 tablet 0    tamoxifen (NOLVADEX) 20 MG tablet TAKE ONE TABLET BY MOUTH DAILY 30 tablet 4    glimepiride (AMARYL) 4 MG tablet Take 2 tablets by mouth every morning (before breakfast) 180 tablet 3     No current facility-administered medications on file prior to visit.        No Known Allergies  Past Medical History:   Diagnosis Date    Cancer [facility])     right breast    Carpal tunnel syndrome     Colon polyps     Dizziness     Fatty liver     Headache(784.0)     Hyperlipidemia     Hypertension     Meniere disease     Migraines     Obesity     Optic atrophy     left    Papilloma of breast     bilateral    Thoracic back pain     Tinnitus     Type II or unspecified type diabetes mellitus without mention of complication, not stated as uncontrolled     Urge incontinence     Vertigo       Past Surgical History:   Procedure Laterality Date    BREAST BIOPSY Bilateral 06/15/2016    : RIGHT NEEDLE LOCALIZATION OF TWO SITES EXCISIONAL BREAST    BREAST SURGERY Bilateral 02/15/2017    SECOND STAGE RIGHT BREAST RECONSTRUCTION WITH PLACEMENT OF SILICONE BREAST IMPLANT ; left breast reduction    CARPAL TUNNEL RELEASE Right 1/24/2019    RIGHT CARPAL TUNNEL RELEASE performed by Aurora Glover MD at [address] Right 07/20/2016     RIGHT SKIN [address] MASTECTOMY WITH Tc99 AND Ladean Douse,      Family History   Problem Relation Age of Onset    Arthritis Mother     Asthma Mother     Diabetes Mother     Heart Disease Mother     High Blood Pressure Mother     High Cholesterol Mother     Hypertension Mother     Osteoarthritis Mother     Arthritis Father     Asthma Father     Cancer Father     High Cholesterol Father     Diabetes Father     Hypertension Father     Asthma Son     Diabetes Sister       Social History     Tobacco Use    Smoking status: Former Smoker     Packs/day: 1.00     Years: 10.00     Pack years: 10.00    Smokeless tobacco: Never Used   Substance Use Topics    Alcohol use: Yes     Alcohol/week: 0.0 standard drinks     Comment: occasional        Review of Systems   Constitutional: Negative for activity change, appetite change, chills, fatigue and fever. HENT: Positive for rhinorrhea. Negative for congestion, sinus pressure, sinus pain and sore throat. Respiratory: Positive for cough, chest tightness and shortness of breath. Negative for wheezing. Cardiovascular: Negative for chest pain, palpitations and leg swelling. Gastrointestinal: Negative for constipation, diarrhea, nausea and vomiting.    Musculoskeletal: Negative for

## 2020-03-17 RX ORDER — LISINOPRIL 10 MG/1
TABLET ORAL
Qty: 30 TABLET | Refills: 10 | Status: SHIPPED | OUTPATIENT
Start: 2020-03-17 | End: 2020-12-17

## 2020-03-25 PROBLEM — G56.03 BILATERAL CARPAL TUNNEL SYNDROME: Status: RESOLVED | Noted: 2018-11-06 | Resolved: 2020-03-24

## 2020-05-28 RX ORDER — OXYBUTYNIN CHLORIDE 15 MG/1
TABLET, EXTENDED RELEASE ORAL
Qty: 90 TABLET | Refills: 5 | Status: SHIPPED | OUTPATIENT
Start: 2020-05-28

## 2020-05-28 RX ORDER — ATORVASTATIN CALCIUM 40 MG/1
TABLET, FILM COATED ORAL
Qty: 30 TABLET | Refills: 5 | Status: SHIPPED | OUTPATIENT
Start: 2020-05-28 | End: 2020-12-21

## 2020-06-09 ENCOUNTER — HOSPITAL ENCOUNTER (OUTPATIENT)
Age: 60
Discharge: HOME | End: 2020-06-09
Payer: COMMERCIAL

## 2020-06-09 LAB
A/G RATIO: 1.4 (ref 1.1–2.2)
ALBUMIN SERPL-MCNC: 4 G/DL (ref 3.4–5)
ALP BLD-CCNC: 94 U/L (ref 40–129)
ALT SERPL-CCNC: 17 U/L (ref 10–40)
ANION GAP SERPL CALCULATED.3IONS-SCNC: 10 MMOL/L (ref 3–16)
AST SERPL-CCNC: 23 U/L (ref 15–37)
BASOPHILS ABSOLUTE: 0.1 K/UL (ref 0–0.2)
BASOPHILS RELATIVE PERCENT: 1.3 %
BILIRUB SERPL-MCNC: 0.3 MG/DL (ref 0–1)
BUN BLDV-MCNC: 12 MG/DL (ref 7–20)
CALCIUM SERPL-MCNC: 9.4 MG/DL (ref 8.3–10.6)
CHLORIDE BLD-SCNC: 102 MMOL/L (ref 99–110)
CHOLESTEROL, TOTAL: 158 MG/DL (ref 0–199)
CO2: 26 MMOL/L (ref 21–32)
CREAT SERPL-MCNC: 0.6 MG/DL (ref 0.6–1.1)
CREATININE URINE: 76.3 MG/DL (ref 28–259)
EOSINOPHILS ABSOLUTE: 0.1 K/UL (ref 0–0.6)
EOSINOPHILS RELATIVE PERCENT: 1.4 %
GFR AFRICAN AMERICAN: >60
GFR NON-AFRICAN AMERICAN: >60
GLOBULIN: 2.8 G/DL
GLUCOSE FASTING: 129 MG/DL (ref 70–99)
HCT VFR BLD CALC: 42.9 % (ref 36–48)
HDLC SERPL-MCNC: 43 MG/DL (ref 40–60)
HEMOGLOBIN: 14.1 G/DL (ref 12–16)
LDL CHOLESTEROL CALCULATED: 76 MG/DL
LYMPHOCYTES ABSOLUTE: 3.2 K/UL (ref 1–5.1)
LYMPHOCYTES RELATIVE PERCENT: 43.7 %
MCH RBC QN AUTO: 28.8 PG (ref 26–34)
MCHC RBC AUTO-ENTMCNC: 32.7 G/DL (ref 31–36)
MCV RBC AUTO: 87.9 FL (ref 80–100)
MICROALBUMIN UR-MCNC: <1.2 MG/DL
MICROALBUMIN/CREAT UR-RTO: NORMAL MG/G (ref 0–30)
MONOCYTES ABSOLUTE: 0.5 K/UL (ref 0–1.3)
MONOCYTES RELATIVE PERCENT: 7.5 %
NEUTROPHILS ABSOLUTE: 3.3 K/UL (ref 1.7–7.7)
NEUTROPHILS RELATIVE PERCENT: 46.1 %
PDW BLD-RTO: 13.3 % (ref 12.4–15.4)
PLATELET # BLD: 281 K/UL (ref 135–450)
PMV BLD AUTO: 9 FL (ref 5–10.5)
POTASSIUM SERPL-SCNC: 4.4 MMOL/L (ref 3.5–5.1)
RBC # BLD: 4.88 M/UL (ref 4–5.2)
SODIUM BLD-SCNC: 138 MMOL/L (ref 136–145)
TOTAL PROTEIN: 6.8 G/DL (ref 6.4–8.2)
TRIGL SERPL-MCNC: 194 MG/DL (ref 0–150)
VLDLC SERPL CALC-MCNC: 39 MG/DL
WBC # BLD: 7.2 K/UL (ref 4–11)

## 2020-06-09 PROCEDURE — 82570 ASSAY OF URINE CREATININE: CPT

## 2020-06-09 PROCEDURE — 80061 LIPID PANEL: CPT

## 2020-06-09 PROCEDURE — 85025 COMPLETE CBC W/AUTO DIFF WBC: CPT

## 2020-06-09 PROCEDURE — 36415 COLL VENOUS BLD VENIPUNCTURE: CPT

## 2020-06-09 PROCEDURE — 82043 UR ALBUMIN QUANTITATIVE: CPT

## 2020-06-09 PROCEDURE — 80053 COMPREHEN METABOLIC PANEL: CPT

## 2020-06-09 PROCEDURE — 83036 HEMOGLOBIN GLYCOSYLATED A1C: CPT

## 2020-06-10 LAB
ESTIMATED AVERAGE GLUCOSE: 237.4 MG/DL
HBA1C MFR BLD: 9.9 %

## 2020-06-17 ENCOUNTER — OFFICE VISIT (OUTPATIENT)
Dept: FAMILY MEDICINE CLINIC | Age: 60
End: 2020-06-17
Payer: COMMERCIAL

## 2020-06-17 VITALS
DIASTOLIC BLOOD PRESSURE: 78 MMHG | WEIGHT: 167.2 LBS | OXYGEN SATURATION: 99 % | HEART RATE: 76 BPM | TEMPERATURE: 98.4 F | SYSTOLIC BLOOD PRESSURE: 130 MMHG | BODY MASS INDEX: 31.08 KG/M2

## 2020-06-17 PROCEDURE — 99214 OFFICE O/P EST MOD 30 MIN: CPT | Performed by: NURSE PRACTITIONER

## 2020-06-17 RX ORDER — TIZANIDINE 4 MG/1
4 TABLET ORAL 3 TIMES DAILY
Qty: 30 TABLET | Refills: 0 | Status: SHIPPED | OUTPATIENT
Start: 2020-06-17 | End: 2020-06-27

## 2020-06-17 RX ORDER — TRAMADOL HYDROCHLORIDE 50 MG/1
50 TABLET ORAL EVERY 6 HOURS PRN
Qty: 60 TABLET | Refills: 0 | Status: SHIPPED | OUTPATIENT
Start: 2020-06-17 | End: 2021-04-10

## 2020-06-17 ASSESSMENT — ENCOUNTER SYMPTOMS
CONSTIPATION: 0
SHORTNESS OF BREATH: 0
NAUSEA: 0
BACK PAIN: 1
DIARRHEA: 0

## 2020-06-17 NOTE — PROGRESS NOTES
disease     Migraines     Obesity     Optic atrophy     left    Papilloma of breast     bilateral    Thoracic back pain     Tinnitus     Type II or unspecified type diabetes mellitus without mention of complication, not stated as uncontrolled     Urge incontinence     Vertigo       Past Surgical History:   Procedure Laterality Date    BREAST BIOPSY Bilateral 06/15/2016    : RIGHT NEEDLE LOCALIZATION OF TWO SITES EXCISIONAL BREAST    BREAST SURGERY Bilateral 02/15/2017    SECOND STAGE RIGHT BREAST RECONSTRUCTION WITH PLACEMENT OF SILICONE BREAST IMPLANT ; left breast reduction    CARPAL TUNNEL RELEASE Right 1/24/2019    RIGHT CARPAL TUNNEL RELEASE performed by Mansi Candelario MD at [address] Right 07/20/2016     RIGHT SKIN [address] MASTECTOMY WITH Tc99 AND Isidra Morning,      Family History   Problem Relation Age of Onset    Arthritis Mother     Asthma Mother     Diabetes Mother     Heart Disease Mother     High Blood Pressure Mother     High Cholesterol Mother     Hypertension Mother     Osteoarthritis Mother     Arthritis Father     Asthma Father     Cancer Father     High Cholesterol Father     Diabetes Father     Hypertension Father     Asthma Son     Diabetes Sister       Social History     Tobacco Use    Smoking status: Former Smoker     Packs/day: 1.00     Years: 10.00     Pack years: 10.00    Smokeless tobacco: Never Used   Substance Use Topics    Alcohol use: Yes     Alcohol/week: 0.0 standard drinks     Comment: occasional        Review of Systems   Constitutional: Positive for fatigue. Negative for activity change and appetite change. Eyes: Negative for visual disturbance. Respiratory: Negative for shortness of breath. Cardiovascular: Negative for chest pain, palpitations and leg swelling. Gastrointestinal: Negative for constipation, diarrhea and nausea.    Musculoskeletal: Positive for arthralgias (bilateral shoulder pain) and

## 2020-08-28 ENCOUNTER — TELEPHONE (OUTPATIENT)
Dept: SURGERY | Age: 60
End: 2020-08-28

## 2020-08-28 NOTE — TELEPHONE ENCOUNTER
Called patient because she had a appointment on 9/28/20 with Johnny Joshi that has been cancelled and rescheduled due to COVID schedule change. The patient is scheduled for 9/29/20 with a 10:00 a.m mammogram and a 11:00 a.m office visit with Johnny Joshi. I left the patient a voicemail with this information.

## 2020-09-01 ENCOUNTER — NURSE TRIAGE (OUTPATIENT)
Dept: OTHER | Facility: CLINIC | Age: 60
End: 2020-09-01

## 2020-09-01 ENCOUNTER — OFFICE VISIT (OUTPATIENT)
Dept: FAMILY MEDICINE CLINIC | Age: 60
End: 2020-09-01
Payer: COMMERCIAL

## 2020-09-01 VITALS
TEMPERATURE: 97.7 F | HEART RATE: 86 BPM | BODY MASS INDEX: 30.49 KG/M2 | DIASTOLIC BLOOD PRESSURE: 84 MMHG | OXYGEN SATURATION: 98 % | WEIGHT: 164 LBS | SYSTOLIC BLOOD PRESSURE: 120 MMHG

## 2020-09-01 LAB — HBA1C MFR BLD: 11.1 %

## 2020-09-01 PROCEDURE — 83036 HEMOGLOBIN GLYCOSYLATED A1C: CPT | Performed by: NURSE PRACTITIONER

## 2020-09-01 PROCEDURE — 99214 OFFICE O/P EST MOD 30 MIN: CPT | Performed by: NURSE PRACTITIONER

## 2020-09-01 RX ORDER — TRAZODONE HYDROCHLORIDE 50 MG/1
50 TABLET ORAL NIGHTLY PRN
Qty: 30 TABLET | Refills: 5 | Status: SHIPPED | OUTPATIENT
Start: 2020-09-01 | End: 2020-09-01

## 2020-09-01 RX ORDER — HYDROXYZINE PAMOATE 25 MG/1
25 CAPSULE ORAL 3 TIMES DAILY PRN
Qty: 90 CAPSULE | Refills: 0 | Status: SHIPPED | OUTPATIENT
Start: 2020-09-01 | End: 2020-09-24

## 2020-09-01 ASSESSMENT — ENCOUNTER SYMPTOMS
CHEST TIGHTNESS: 0
CONSTIPATION: 0
NAUSEA: 0
DIARRHEA: 0
VOMITING: 0
SHORTNESS OF BREATH: 0

## 2020-09-01 NOTE — TELEPHONE ENCOUNTER
Received call from Bianca Waterman in [facility]. She woke Sunday and off balance , took off two days, her sugar was 389. Yesterday morning 412, today 179. She is not taking her meds regularly. She did take her Metformin twice yesterday. She is feeling off balance and shaky. Has not had breakfast yet. She is drinking a lot of water. She is not following a diabetic diet. She states she does not have a reason for not taking meds or eating properly  Call soft transferred to Eliza Bourgeois in [address] to schedule appointment. Please do not reply to the triage nurse through this encounter. Any subsequent communication should be directly with the patient.      Reason for Disposition   Lightheadedness (dizziness) present now, after 2 hours of rest and fluids    Protocols used: DIZZINESS-ADULT-[place]

## 2020-09-01 NOTE — PROGRESS NOTES
Carolyn Coulter  : 1960  Encounter date: 2020    This yosvany 63 y.o. female who presents with  Chief Complaint   Patient presents with    Blood Sugar Problem     Patient has complaints of high blood sugar since . [phone]       History of present illness:    HPI Pt is 63year old female with uncontrolled blood sugars, stopped taking all medications x 1 month. Reports last few days have felt out of control, off balance. , dizziness. Reports possible seizure activity/shaking that occurred 2 days ago. Blood sugar readings, fasting have ranged from 168- 412. Pt reports blood pressure has been well controlled. Pt reports recently started retaking medication and has lowered BS but still remains foggy headed, dizzy and out of it. Pt denies trauma or severe HA. Current Outpatient Medications on File Prior to Visit   Medication Sig Dispense Refill    metFORMIN (GLUCOPHAGE) 1000 MG tablet Take 1 tablet by mouth 2 times daily (with meals) 180 tablet 1    traMADol (ULTRAM) 50 MG tablet Take 1 tablet by mouth every 6 hours as needed for Pain for up to 297 days. 60 tablet 0    diclofenac (VOLTAREN) 50 MG EC tablet Take 1 tablet by mouth 3 times daily (with meals) 60 tablet 3    atorvastatin (LIPITOR) 40 MG tablet TAKE ONE TABLET BY MOUTH DAILY 30 tablet 5    oxybutynin (DITROPAN XL) 15 MG extended release tablet TAKE ONE TABLET BY MOUTH DAILY 90 tablet 5    lisinopril (PRINIVIL;ZESTRIL) 10 MG tablet TAKE ONE TABLET BY MOUTH DAILY 30 tablet 10    linagliptin (TRADJENTA) 5 MG tablet TAKE ONE TABLET BY MOUTH DAILY 30 tablet 5    glimepiride (AMARYL) 4 MG tablet Take 2 tablets by mouth every morning (before breakfast) 180 tablet 3    tamoxifen (NOLVADEX) 20 MG tablet TAKE ONE TABLET BY MOUTH DAILY 30 tablet 4     No current facility-administered medications on file prior to visit.        No Known Allergies  Past Medical History:   Diagnosis Date    Cancer [facility])     right breast    Carpal tunnel Negative for chest pain, palpitations and leg swelling. Gastrointestinal: Negative for constipation, diarrhea, nausea and vomiting. Musculoskeletal: Positive for gait problem (feels off balance). Allergic/Immunologic: Negative for immunocompromised state. Neurological: Positive for dizziness, seizures and light-headedness. Negative for tremors, syncope, speech difficulty, weakness and headaches. Psychiatric/Behavioral: Positive for confusion, decreased concentration and sleep disturbance. Objective:    /84 (Site: Left Upper Arm, Position: Sitting, Cuff Size: Medium Adult)   Pulse 86   Temp 97.7 °F (36.5 °C)   Wt 164 lb (74.4 kg)   SpO2 98%   BMI 30.49 kg/m²   Weight: 164 lb (74.4 kg)     BP Readings from Last 3 Encounters:   09/01/20 120/84   06/17/20 130/78   03/13/20 112/66     Wt Readings from Last 3 Encounters:   09/01/20 164 lb (74.4 kg)   06/17/20 167 lb 3.2 oz (75.8 kg)   03/13/20 167 lb 12.8 oz (76.1 kg)     BMI Readings from Last 3 Encounters:   09/01/20 30.49 kg/m²   06/17/20 31.08 kg/m²   03/13/20 31.19 kg/m²       Physical Exam  Vitals signs reviewed. Constitutional:       Appearance: Normal appearance. She is well-developed. She is obese. HENT:      Right Ear: Tympanic membrane normal.      Left Ear: Tympanic membrane normal.      Nose: Nose normal.   Eyes:      Extraocular Movements: Extraocular movements intact. Pupils: Pupils are equal, round, and reactive to light. Neck:      Musculoskeletal: Neck supple. No muscular tenderness. Cardiovascular:      Rate and Rhythm: Normal rate and regular rhythm. Heart sounds: Normal heart sounds. No murmur. Pulmonary:      Effort: Pulmonary effort is normal.      Breath sounds: Normal breath sounds. Musculoskeletal:      Right lower leg: No edema. Left lower leg: No edema. Lymphadenopathy:      Cervical: No cervical adenopathy. Skin:     General: Skin is warm and dry.       Capillary Refill: Capillary refill takes less than 2 seconds. Neurological:      Mental Status: She is alert and oriented to person, place, and time. Sensory: No sensory deficit. Motor: No weakness. Coordination: Coordination normal.      Gait: Gait abnormal.      Deep Tendon Reflexes: Reflexes normal.         Assessment/Plan    1. Type 2 diabetes mellitus without complication, without long-term current use of insulin ([facility])  Advised to continue metformin 1000 mg BID  Continue Tradjenta 5 mg  Continue Amaryl 8 mg  Trial run trulicity 0.5 mg injectable once a week- samples provided  Advised to watch CHO  Discussed Endocrinology referral  - POCT glycosylated hemoglobin (Hb A1C)- 11.1    2. Dizziness  Work excuse provided  - hydrOXYzine (VISTARIL) 25 MG capsule; Take 1 capsule by mouth 3 times daily as needed for Anxiety  Dispense: 90 capsule; Refill: 0  - MRI BRAIN W WO CONTRAST; Future    3. Seizure-like activity ([facility] [address]  BUN/ Creatinine ordered  - MRI BRAIN W WO CONTRAST; Future      Return in about 1 month (around 10/1/2020) for medication re-check, diabetes re-check. This dictation was generated by voice recognition computer software. Although all attempts are made to edit the dictation for accuracy, there may be errors in the transcription that are not intended.

## 2020-09-01 NOTE — LETTER
[facility] [address] [address]  Phone: [phone]  Fax: Chelly Sethi, APRN - NP        September 1, 2020     Patient: Rivka Coreas   YOB: 1960   Date of Visit: 9/1/2020       To Whom It May Concern: It is my medical opinion that Rivka Coreas be excused from work from 8/30/20 to 9/2/20. If you have any questions or concerns, please don't hesitate to call.     Sincerely,        SOHAM Guerrero - NP

## 2020-09-14 ENCOUNTER — TELEPHONE (OUTPATIENT)
Dept: FAMILY MEDICINE CLINIC | Age: 60
End: 2020-09-14

## 2020-09-14 RX ORDER — DULAGLUTIDE 0.75 MG/.5ML
0.75 INJECTION, SOLUTION SUBCUTANEOUS WEEKLY
Qty: 4 PEN | Refills: 0 | Status: SHIPPED | OUTPATIENT
Start: 2020-09-14 | End: 2020-10-05

## 2020-09-14 RX ORDER — DULAGLUTIDE 0.75 MG/.5ML
0.75 INJECTION, SOLUTION SUBCUTANEOUS WEEKLY
Status: CANCELLED | OUTPATIENT
Start: 2020-09-14

## 2020-09-14 NOTE — TELEPHONE ENCOUNTER
Patient advised. Patient states that she feels like this is working. Would like medication to be sent to the pharmacy. Patient will call if too expensive and will  sample. Patient is schedule for 1 month follow up in October as well.

## 2020-09-14 NOTE — TELEPHONE ENCOUNTER
----- Message from Blake Alberto sent at 9/14/2020 10:44 AM EDT -----  Subject: Message to Provider    QUESTIONS  Information for Provider? Pt received her first Trulicity shot 3/0/8217   she is unsure when she needs the next shot or when she should be seen   next to get her next shot  ---------------------------------------------------------------------------  --------------  [address]  What is the best way for the office to contact you? OK to leave message on   voicemail  Preferred Call Back Phone Number? [phone]  ---------------------------------------------------------------------------  --------------  SCRIPT ANSWERS  Relationship to Patient?  Self

## 2020-09-24 RX ORDER — HYDROXYZINE PAMOATE 25 MG/1
25 CAPSULE ORAL 3 TIMES DAILY PRN
Qty: 90 CAPSULE | Refills: 0 | Status: SHIPPED | OUTPATIENT
Start: 2020-09-24 | End: 2020-10-07

## 2020-09-24 NOTE — TELEPHONE ENCOUNTER
Medication:   Requested Prescriptions     Pending Prescriptions Disp Refills    hydrOXYzine (VISTARIL) 25 MG capsule [Pharmacy Med Name: HYDROXYZINE FLORINDA 25 MG CAP] 90 capsule 0     Sig: TAKE 1 CAPSULE BY MOUTH 3 TIMES DAILY AS NEEDED FOR ANXIETY      Last Filled:      Patient Phone Number: [phone] (home)     Last appt: 9/1/2020   Next appt: 10/7/2020    Last OARRS:   RX Monitoring 8/30/2019   Attestation -   Periodic Controlled Substance Monitoring Possible medication side effects, risk of tolerance/dependence & alternative treatments discussed. ;No signs of potential drug abuse or diversion identified. ;Assessed functional status. ;Obtaining appropriate analgesic effect of treatment. PDMP Monitoring:    Last PDMP Marleen Hopkins as Reviewed [facility]):  Review User Review Instant Review Result   Caden Gibson 6/17/2020 10:33 AM Reviewed PDMP [1]     Preferred Pharmacy:   [place] [address] [phone] [facility] [phone]  . [phone]  Phone: [phone] Fax: [phone]    [facility]/pharmacy #[id number] [facility] [address] [address] [phone] - f [phone] [address]  Phone: [phone] Fax: [phone]

## 2020-09-29 ENCOUNTER — OFFICE VISIT (OUTPATIENT)
Dept: SURGERY | Age: 60
End: 2020-09-29
Payer: COMMERCIAL

## 2020-09-29 ENCOUNTER — HOSPITAL ENCOUNTER (OUTPATIENT)
Dept: WOMENS IMAGING | Age: 60
Discharge: HOME | End: 2020-09-29
Payer: COMMERCIAL

## 2020-09-29 VITALS
HEART RATE: 85 BPM | SYSTOLIC BLOOD PRESSURE: 126 MMHG | OXYGEN SATURATION: 99 % | BODY MASS INDEX: 29.56 KG/M2 | DIASTOLIC BLOOD PRESSURE: 73 MMHG | WEIGHT: 159 LBS

## 2020-09-29 PROCEDURE — 99213 OFFICE O/P EST LOW 20 MIN: CPT | Performed by: NURSE PRACTITIONER

## 2020-09-29 PROCEDURE — 77063 BREAST TOMOSYNTHESIS BI: CPT

## 2020-09-29 NOTE — PROGRESS NOTES
Surgical Breast Oncology       CC: One year [id number] for breast check and mammogram     HPI: Gilmer Hubbard is a 63 y.o. woman here for annual follow up for breast check secondary to personal history of right breast cancer. She is s/p right mastectomy with SLNB. She is on tamoxifen and tolerating it well. She has no breast related concerns today. She states that she does perform routine self breast evaluations and has not noticed any new abnormalities such as masses, skin changes, color changes,nipple discharge, or changes to the nipple-areolar complex. Mammogram today reveals no concerning findings suggestive of malignancy. BI-RADS 2. INTERVAL HX:  On 7/20/2016 she underwent a right mastectomy with sentinel lymph node biopsy and immediate tissue expander based reconstruction. Pathology identified DCIS, grade 2, ER positive, WA positive. There were 0/6 lymph nodes involved with carcinoma. [id number].     Tamoxifen initiated.     On 9/21/2018 she underwent left breast screening mammography. There are no concerning findings suggestive of malignancy. BI-RADS 2.     On 9/23/2019 she underwent left screening mammography. There are stable findings noted. There are no new concerning findings suggestive of malignancy. BI-RADS2    On 9/29/2020 she underwent left screening mammography. There are stable findings noted. There are new concerning findings suggestive of malignancy. BI-RADS 2.         Past Medical History:   Diagnosis Date    Cancer [facility])     right breast    Carpal tunnel syndrome     Colon polyps     Dizziness     Fatty liver     Headache(784.0)     Hyperlipidemia     Hypertension     Meniere disease     Migraines     Obesity     Optic atrophy     left    Papilloma of breast     bilateral    Thoracic back pain     Tinnitus     Type II or unspecified type diabetes mellitus without mention of complication, not stated as uncontrolled     Urge incontinence     tablet 0    atorvastatin (LIPITOR) 40 MG tablet TAKE ONE TABLET BY MOUTH DAILY 30 tablet 5    oxybutynin (DITROPAN XL) 15 MG extended release tablet TAKE ONE TABLET BY MOUTH DAILY 90 tablet 5    lisinopril (PRINIVIL;ZESTRIL) 10 MG tablet TAKE ONE TABLET BY MOUTH DAILY 30 tablet 10    linagliptin (TRADJENTA) 5 MG tablet TAKE ONE TABLET BY MOUTH DAILY 30 tablet 5    tamoxifen (NOLVADEX) 20 MG tablet TAKE ONE TABLET BY MOUTH DAILY 30 tablet 4    glimepiride (AMARYL) 4 MG tablet Take 2 tablets by mouth every morning (before breakfast) 180 tablet 3     No current facility-administered medications on file prior to visit. Medications: documentation has been reviewed in the electronic medical record and patient office intake form. REVIEW OF SYSTEMS:  Constitutional: Negative for fever  HENT: Negative for sore throat  Eyes: Negative for redness   Respiratory: Negative for dyspnea, cough  Cardiovascular: Negative for chest pain  Gastrointestinal: Negative for vomiting, diarrhea   Genitourinary: Negative for hematuria   Musculoskeletal: Negative for arthralgias   Skin: Negative for rash  Neurological: Negative for syncope  Hematological: Negative for adenopathy  Psychiatric/Behavorial: Negative for anxiety         PHYSICAL EXAM:  /73   Pulse 85   Wt 159 lb (72.1 kg)   SpO2 99%   BMI 29.56 kg/m²   Constitutional: She appearswell-nourished. No apparent distress. Breast: The patient was examined in the upright and supine position. Right: Right breast has been surgically removed. There is implant-based reconstruction in place. Well-healed scar. Well-healed ipsilateral axillary scar. Postsurgical changes noted. She has some redundant skin. Projection is somewhat asymmetrical.  No new masses or changes in breast contour. No skin changes of the breast or nipple areolar complex. No nipple inversion or discharge. No erythema, thickening (peau d'orange), or dimpling.         Left: Evidence of and the results were discussed with the patient, all questions answered   Education provided for Healthy Lifestyle Recommendations: healthy diet, routine exercise, weight control, decreased alcohol consumption.  Follow up after annual mammogram in 1 year, due September 2021          Emily Camacho [address]   Surgical Breast Oncology   [phone]    All of the patient's questions were answered at this time however, she was encouraged to call the office with any further inquiries. Approximately 15 minutes of time were spent in this visit of which 50% or more of the time was related to coordination of care.

## 2020-10-05 RX ORDER — DULAGLUTIDE 0.75 MG/.5ML
0.75 INJECTION, SOLUTION SUBCUTANEOUS WEEKLY
Qty: 4 PEN | Refills: 0 | Status: SHIPPED | OUTPATIENT
Start: 2020-10-05 | End: 2020-10-07 | Stop reason: SDUPTHER

## 2020-10-07 ENCOUNTER — OFFICE VISIT (OUTPATIENT)
Dept: FAMILY MEDICINE CLINIC | Age: 60
End: 2020-10-07
Payer: COMMERCIAL

## 2020-10-07 VITALS
HEIGHT: 61 IN | BODY MASS INDEX: 29.87 KG/M2 | HEART RATE: 92 BPM | OXYGEN SATURATION: 98 % | SYSTOLIC BLOOD PRESSURE: 130 MMHG | TEMPERATURE: 96.8 F | WEIGHT: 158.2 LBS | DIASTOLIC BLOOD PRESSURE: 90 MMHG

## 2020-10-07 PROCEDURE — 99214 OFFICE O/P EST MOD 30 MIN: CPT | Performed by: NURSE PRACTITIONER

## 2020-10-07 RX ORDER — TRAZODONE HYDROCHLORIDE 50 MG/1
TABLET ORAL
COMMUNITY
Start: 2020-09-24 | End: 2020-12-17

## 2020-10-07 RX ORDER — DULAGLUTIDE 0.75 MG/.5ML
0.75 INJECTION, SOLUTION SUBCUTANEOUS WEEKLY
Qty: 4 PEN | Refills: 2 | Status: SHIPPED | OUTPATIENT
Start: 2020-10-07 | End: 2021-01-26

## 2020-10-07 ASSESSMENT — PATIENT HEALTH QUESTIONNAIRE - PHQ9
SUM OF ALL RESPONSES TO PHQ QUESTIONS 1-9: 0
1. LITTLE INTEREST OR PLEASURE IN DOING THINGS: 0
SUM OF ALL RESPONSES TO PHQ9 QUESTIONS 1 & 2: 0
2. FEELING DOWN, DEPRESSED OR HOPELESS: 0
SUM OF ALL RESPONSES TO PHQ QUESTIONS 1-9: 0

## 2020-10-07 ASSESSMENT — ENCOUNTER SYMPTOMS: SHORTNESS OF BREATH: 0

## 2020-10-07 NOTE — PROGRESS NOTES
Jaiden Kaufman  : 1960  Encounter date: 10/7/2020    This yosvany 63 y.o. female who presents with  Chief Complaint   Patient presents with    1 Month Follow-Up     Patient presents today for 1 month follow up on diabetes. History of present illness:    HPI Pt is 63year old for diabetes recheck. Pt recently started on Trulicity 0.88 mg once weekly. Reports first week with nausea, since resolved. Pt reports BS readings range from , fasting and nonfasting. Pt reports eating healthier and limiting sweets, reports feeling a lot better. Pt reports continued dizziness with position change. Recent MRI negative for anomaly. Discussed vertigo and vestibular testing. Denies sinus congestion or muffled hearing. Pt reports not checking BP at home due to not have cuff, advised controlled <140/90. Current Outpatient Medications on File Prior to Visit   Medication Sig Dispense Refill    traZODone (DESYREL) 50 MG tablet       diclofenac (VOLTAREN) 50 MG EC tablet TAKE 1 TABLET BY MOUTH 3 TIMES DAILY WITH MEALS 60 tablet 3    metFORMIN (GLUCOPHAGE) 1000 MG tablet Take 1 tablet by mouth 2 times daily (with meals) 180 tablet 1    traMADol (ULTRAM) 50 MG tablet Take 1 tablet by mouth every 6 hours as needed for Pain for up to 297 days.  60 tablet 0    atorvastatin (LIPITOR) 40 MG tablet TAKE ONE TABLET BY MOUTH DAILY 30 tablet 5    oxybutynin (DITROPAN XL) 15 MG extended release tablet TAKE ONE TABLET BY MOUTH DAILY 90 tablet 5    lisinopril (PRINIVIL;ZESTRIL) 10 MG tablet TAKE ONE TABLET BY MOUTH DAILY 30 tablet 10    linagliptin (TRADJENTA) 5 MG tablet TAKE ONE TABLET BY MOUTH DAILY 30 tablet 5    glimepiride (AMARYL) 4 MG tablet Take 2 tablets by mouth every morning (before breakfast) (Patient taking differently: Take 8 mg by mouth every morning (before breakfast) Pt states only taking 1 per day.) 180 tablet 3    tamoxifen (NOLVADEX) 20 MG tablet TAKE ONE TABLET BY MOUTH DAILY 30 tablet 4 No current facility-administered medications on file prior to visit. No Known Allergies  Past Medical History:   Diagnosis Date    Cancer ([address]     right breast    Carpal tunnel syndrome     Colon polyps     Dizziness     Fatty liver     Headache(784.0)     Hyperlipidemia     Hypertension     Meniere disease     Migraines     Obesity     Optic atrophy     left    Papilloma of breast     bilateral    Thoracic back pain     Tinnitus     Type II or unspecified type diabetes mellitus without mention of complication, not stated as uncontrolled     Urge incontinence     Vertigo       Past Surgical History:   Procedure Laterality Date    BREAST BIOPSY Bilateral 06/15/2016    : RIGHT NEEDLE LOCALIZATION OF TWO SITES EXCISIONAL BREAST    BREAST SURGERY Bilateral 02/15/2017    SECOND STAGE RIGHT BREAST RECONSTRUCTION WITH PLACEMENT OF SILICONE BREAST IMPLANT ; left breast reduction    CARPAL TUNNEL RELEASE Right 1/24/2019    RIGHT CARPAL TUNNEL RELEASE performed by Reynaldo Mix MD at [address] Right 07/20/2016     RIGHT SKIN [address] MASTECTOMY WITH Tc99 AND Verónica Cifuentes,      Family History   Problem Relation Age of Onset    Arthritis Mother     Asthma Mother     Diabetes Mother     Heart Disease Mother     High Blood Pressure Mother     High Cholesterol Mother     Hypertension Mother     Osteoarthritis Mother     Arthritis Father     Asthma Father     Cancer Father     High Cholesterol Father     Diabetes Father     Hypertension Father     Asthma Son     Diabetes Sister       Social History     Tobacco Use    Smoking status: Former Smoker     Packs/day: 1.00     Years: 10.00     Pack years: 10.00    Smokeless tobacco: Never Used   Substance Use Topics    Alcohol use:  Yes     Alcohol/week: 0.0 standard drinks     Comment: occasional        Review of Systems   Constitutional: Negative for activity change, appetite change, fatigue and is warm and dry. Neurological:      Mental Status: She is alert and oriented to person, place, and time. Assessment/Plan    1. Type 2 diabetes mellitus without complication, without long-term current use of insulin (HCC)  Continue diet and exercise, weight loss  Advised will perform in office HgbA1c in 3 mos  - Dulaglutide (TRULICITY) 2.06 JS/8.9VY SOPN; Inject 0.75 mg into the skin once a week  Dispense: 4 pen; Refill: 2    2. Dizziness  Discussed vestibular testing  - Jennifer Barksdale MD, Otolaryngology, [facility]    3. Essential hypertension  Provided RX for BP cuff  Continue lisinopril 10 mg daily  Low salt diet      Return in about 3 months (around 1/7/2021) for lab review, hypertension re-check, diabetes re-check, medication re-check. This dictation was generated by voice recognition computer software. Although all attempts are made to edit the dictation for accuracy, there may be errors in the transcription that are not intended.

## 2020-10-22 NOTE — TELEPHONE ENCOUNTER
Medication:   Requested Prescriptions      No prescriptions requested or ordered in this encounter     Last Filled:      Last appt: 10/7/2020   Next appt: 1/7/2021    Last Lipid:   Lab Results   Component Value Date    CHOL 158 06/09/2020    TRIG 194 06/09/2020    HDL 43 06/09/2020    LDLCALC 76 06/09/2020

## 2020-10-23 RX ORDER — HYDROXYZINE PAMOATE 25 MG/1
25 CAPSULE ORAL 3 TIMES DAILY PRN
Qty: 90 CAPSULE | Refills: 0 | Status: SHIPPED | OUTPATIENT
Start: 2020-10-23 | End: 2020-11-30

## 2020-11-27 NOTE — TELEPHONE ENCOUNTER
Medication:   Requested Prescriptions     Pending Prescriptions Disp Refills    hydrOXYzine (VISTARIL) 25 MG capsule [Pharmacy Med Name: HYDROXYZINE FLORINDA 25 MG CAP] 90 capsule 0     Sig: TAKE 1 CAPSULE BY MOUTH 3 TIMES DAILY AS NEEDED FOR ANXIETY. Last Filled:      Patient Phone Number: [phone] (home)     Last appt: 10/7/2020   Next appt: 1/7/2021    Last OARRS:   RX Monitoring 8/30/2019   Attestation -   Periodic Controlled Substance Monitoring Possible medication side effects, risk of tolerance/dependence & alternative treatments discussed. ;No signs of potential drug abuse or diversion identified. ;Assessed functional status. ;Obtaining appropriate analgesic effect of treatment. PDMP Monitoring:    Last PDMP Veronika Owens as Reviewed [facility]):  Review User Review Instant Review Result   Jasmin Alex 6/17/2020 10:33 AM Reviewed PDMP [1]     Preferred Pharmacy:     [address] [place] [address] [phone] - F [phone]  [facility] [[email or web address]] [address]   Radha Torres [address]  Phone: [phone] Fax: [phone]

## 2020-11-30 RX ORDER — HYDROXYZINE PAMOATE 25 MG/1
25 CAPSULE ORAL 3 TIMES DAILY PRN
Qty: 90 CAPSULE | Refills: 0 | Status: SHIPPED | OUTPATIENT
Start: 2020-11-30 | End: 2020-12-21 | Stop reason: SDUPTHER

## 2020-12-17 RX ORDER — LISINOPRIL 10 MG/1
TABLET ORAL
Qty: 90 TABLET | Refills: 3 | Status: SHIPPED | OUTPATIENT
Start: 2020-12-17 | End: 2021-10-19 | Stop reason: SDUPTHER

## 2020-12-17 RX ORDER — TRAZODONE HYDROCHLORIDE 50 MG/1
TABLET ORAL
Qty: 90 TABLET | Refills: 1 | Status: SHIPPED | OUTPATIENT
Start: 2020-12-17 | End: 2021-10-19 | Stop reason: SDUPTHER

## 2020-12-21 RX ORDER — ATORVASTATIN CALCIUM 40 MG/1
TABLET, FILM COATED ORAL
Qty: 90 TABLET | Refills: 1 | Status: SHIPPED | OUTPATIENT
Start: 2020-12-21 | End: 2021-11-01

## 2020-12-21 RX ORDER — HYDROXYZINE PAMOATE 25 MG/1
25 CAPSULE ORAL 3 TIMES DAILY PRN
Qty: 90 CAPSULE | Refills: 0 | Status: SHIPPED | OUTPATIENT
Start: 2020-12-21 | End: 2021-01-20

## 2020-12-21 NOTE — TELEPHONE ENCOUNTER
hydrOXYzine (VISTARIL) 25 MG capsule     Last seen on 10/7/20    Last office note states Return in about 3 months (around 1/7/2021) for lab review, hypertension re-check, diabetes re-check, medication re-check.   F/u scheduled for 1/7/21

## 2020-12-21 NOTE — TELEPHONE ENCOUNTER
Pt needs refill on her atorvastatin (LIPITOR) 40 MG tablet     Last seen on 10/7/20   Last office note states Return in about 3 months (around 1/7/2021) for lab review, hypertension re-check, diabetes re-check, medication re-check.    F/u appt scheduled on 1/7/20

## 2021-01-25 DIAGNOSIS — E11.9 TYPE 2 DIABETES MELLITUS WITHOUT COMPLICATION, WITHOUT LONG-TERM CURRENT USE OF INSULIN (HCC): ICD-10-CM

## 2021-01-25 NOTE — TELEPHONE ENCOUNTER
Medication:   Requested Prescriptions     Pending Prescriptions Disp Refills    TRULICITY 6.79 NN/6.6KM SOPN [Pharmacy Med Name: Henok Mondragon 0.83 TC/0.1 ML PEN]  2     Sig: INJECT 1 PEN ONCE WEEKLY       Patient Phone Number: [phone] (home)     Last appt: 10/7/2020   Next appt: Visit date not found    Last OARRS:   RX Monitoring 8/30/2019   Attestation -   Periodic Controlled Substance Monitoring Possible medication side effects, risk of tolerance/dependence & alternative treatments discussed. ;No signs of potential drug abuse or diversion identified. ;Assessed functional status. ;Obtaining appropriate analgesic effect of treatment.      PDMP Monitoring:    Last PDMP Harry Low as Reviewed [facility]):  Review User Review Instant Review Result   Rodrigue Cage 6/17/2020 10:33 AM Reviewed PDMP [1]     Preferred Pharmacy:   [address] [place] [address] [phone] - f [phone]

## 2021-01-26 RX ORDER — DULAGLUTIDE 0.75 MG/.5ML
INJECTION, SOLUTION SUBCUTANEOUS
Qty: 2 PEN | Refills: 2 | Status: SHIPPED | OUTPATIENT
Start: 2021-01-26 | End: 2021-05-03

## 2021-02-04 ENCOUNTER — OFFICE VISIT (OUTPATIENT)
Dept: FAMILY MEDICINE CLINIC | Age: 61
End: 2021-02-04
Payer: COMMERCIAL

## 2021-02-04 VITALS
WEIGHT: 163 LBS | OXYGEN SATURATION: 97 % | TEMPERATURE: 96.4 F | BODY MASS INDEX: 30 KG/M2 | DIASTOLIC BLOOD PRESSURE: 84 MMHG | HEART RATE: 101 BPM | SYSTOLIC BLOOD PRESSURE: 126 MMHG | HEIGHT: 62 IN

## 2021-02-04 DIAGNOSIS — Z00.00 PREVENTATIVE HEALTH CARE: ICD-10-CM

## 2021-02-04 DIAGNOSIS — B35.1 TOENAIL FUNGUS: ICD-10-CM

## 2021-02-04 DIAGNOSIS — E11.9 TYPE 2 DIABETES MELLITUS WITHOUT COMPLICATION, WITHOUT LONG-TERM CURRENT USE OF INSULIN (HCC): Primary | ICD-10-CM

## 2021-02-04 DIAGNOSIS — I10 ESSENTIAL HYPERTENSION: ICD-10-CM

## 2021-02-04 DIAGNOSIS — E78.49 OTHER HYPERLIPIDEMIA: ICD-10-CM

## 2021-02-04 LAB — HBA1C MFR BLD: 7.8 %

## 2021-02-04 PROCEDURE — 3051F HG A1C>EQUAL 7.0%<8.0%: CPT | Performed by: NURSE PRACTITIONER

## 2021-02-04 PROCEDURE — 99214 OFFICE O/P EST MOD 30 MIN: CPT | Performed by: NURSE PRACTITIONER

## 2021-02-04 PROCEDURE — 83036 HEMOGLOBIN GLYCOSYLATED A1C: CPT | Performed by: NURSE PRACTITIONER

## 2021-02-04 ASSESSMENT — ENCOUNTER SYMPTOMS
VOMITING: 0
SHORTNESS OF BREATH: 0
CHEST TIGHTNESS: 0
DIARRHEA: 0
NAUSEA: 0
CONSTIPATION: 0

## 2021-02-04 ASSESSMENT — PATIENT HEALTH QUESTIONNAIRE - PHQ9
SUM OF ALL RESPONSES TO PHQ9 QUESTIONS 1 & 2: 0
1. LITTLE INTEREST OR PLEASURE IN DOING THINGS: 0

## 2021-02-04 NOTE — PROGRESS NOTES
Constitutional: Negative for activity change, appetite change, fatigue and unexpected weight change. Eyes: Negative for visual disturbance. Respiratory: Negative for chest tightness and shortness of breath. Cardiovascular: Negative for chest pain, palpitations and leg swelling. Gastrointestinal: Negative for constipation, diarrhea, nausea and vomiting. Skin:        Bilateral toenail fungus   Allergic/Immunologic: Negative for immunocompromised state. Neurological: Negative for headaches. Psychiatric/Behavioral: Negative for dysphoric mood and sleep disturbance. The patient is not nervous/anxious. Objective:    /84 (Site: Left Upper Arm, Position: Sitting, Cuff Size: Medium Adult)   Pulse 101   Temp 96.4 °F (35.8 °C) (Temporal)   Ht 5' 2\" (1.575 m)   Wt 163 lb (73.9 kg)   SpO2 97%   BMI 29.81 kg/m²   Weight: 163 lb (73.9 kg)     BP Readings from Last 3 Encounters:   02/04/21 126/84   10/07/20 (!) 130/90   09/29/20 126/73     Wt Readings from Last 3 Encounters:   02/04/21 163 lb (73.9 kg)   10/07/20 158 lb 3.2 oz (71.8 kg)   09/29/20 159 lb (72.1 kg)     BMI Readings from Last 3 Encounters:   02/04/21 29.81 kg/m²   10/07/20 29.87 kg/m²   09/29/20 29.56 kg/m²       Physical Exam  Vitals signs reviewed. Constitutional:       Appearance: Normal appearance. She is well-developed. Eyes:      Extraocular Movements: Extraocular movements intact. Pupils: Pupils are equal, round, and reactive to light. Neck:      Musculoskeletal: Neck supple. No muscular tenderness. Cardiovascular:      Rate and Rhythm: Normal rate and regular rhythm. Heart sounds: Normal heart sounds. No murmur. Pulmonary:      Effort: Pulmonary effort is normal.      Breath sounds: Normal breath sounds. Abdominal:      General: Bowel sounds are normal.      Palpations: Abdomen is soft. Tenderness: There is no abdominal tenderness. Musculoskeletal:      Right lower leg: No edema. Left lower leg: No edema. Lymphadenopathy:      Cervical: No cervical adenopathy. Skin:     General: Skin is warm and dry. Comments: Thickened bilateral great toenails, curvature, discolored   Neurological:      Mental Status: She is alert and oriented to person, place, and time. Psychiatric:         Mood and Affect: Mood normal.     Visual inspection:  Deformity/amputation: present - bilateral toenail thickening, fugus  Skin lesions/pre-ulcerative calluses: absent  Edema: right- negative, left- negative    Sensory exam:  Monofilament sensation: normal  (minimum of 5 random plantar locations tested, avoiding callused areas - > 1 area with absence of sensation is + for neuropathy)    Plus at least one of the following:  Pulses: normal,   Pinprick: Intact  Proprioception: Intact  Vibration (128 Hz): N/A      Assessment/Plan    1. Type 2 diabetes mellitus without complication, without long-term current use of insulin ([facility]  Advised continued monitoring  - POCT glycosylated hemoglobin (Hb A1C)- 7.8  - Hemoglobin A1C; Future  -  DIABETES FOOT EXAM    2. Other hyperlipidemia  - Comprehensive Metabolic Panel, Fasting; Future  - Lipid, Fasting; Future    3. Essential hypertension  - CBC Auto Differential; Future  - Microalbumin / Creatinine Urine Ratio; Future  - TSH with Reflex; Future    4. Preventative health care  - HIV Screen; Future  - HEPATITIS C ANTIBODY; Future    5. Toenail fungus  Discussed treatment plans  - Amb External Referral To Podiatry      Return in about 6 months (around 8/4/2021) for hypertension re-check, diabetes re-check, lab review. This dictation was generated by voice recognition computer software. Although all attempts are made to edit the dictation for accuracy, there may be errors in the transcription that are not intended.

## 2021-05-01 DIAGNOSIS — E11.9 TYPE 2 DIABETES MELLITUS WITHOUT COMPLICATION, WITHOUT LONG-TERM CURRENT USE OF INSULIN (HCC): ICD-10-CM

## 2021-05-03 RX ORDER — DULAGLUTIDE 0.75 MG/.5ML
INJECTION, SOLUTION SUBCUTANEOUS
Qty: 2 PEN | Refills: 2 | Status: SHIPPED | OUTPATIENT
Start: 2021-05-03 | End: 2021-07-27

## 2021-05-03 NOTE — TELEPHONE ENCOUNTER
Medication:   Requested Prescriptions     Pending Prescriptions Disp Refills    TRULICITY 9.08 QX/4.4DV SOPN [Pharmacy Med Name: TRULICITY 8.17 QZ/8.0 ML PEN]  2     Sig: INJECT 1 PEN ONCE WEEKLY      Last Filled:      Patient Phone Number: [phone] (home)     Last appt: 2/4/2021   Next appt: Visit date not found    Last OARRS:   RX Monitoring 8/30/2019   Attestation -   Periodic Controlled Substance Monitoring Possible medication side effects, risk of tolerance/dependence & alternative treatments discussed. ;No signs of potential drug abuse or diversion identified. ;Assessed functional status. ;Obtaining appropriate analgesic effect of treatment. PDMP Monitoring:    Last PDMP Melissa Colon as Reviewed [facility]):  Review User Review Instant Review Result   Shanda Boas 6/17/2020 10:33 AM Reviewed PDMP [1]     Preferred Pharmacy:       [address] [place] [address] [phone] - F [phone]  [facility] [address]   Darnell Robles [address]  Phone: [phone] Fax: [phone]

## 2021-07-27 DIAGNOSIS — E11.9 TYPE 2 DIABETES MELLITUS WITHOUT COMPLICATION, WITHOUT LONG-TERM CURRENT USE OF INSULIN (HCC): ICD-10-CM

## 2021-07-27 RX ORDER — DULAGLUTIDE 0.75 MG/.5ML
INJECTION, SOLUTION SUBCUTANEOUS
Qty: 2 PEN | Refills: 2 | Status: SHIPPED | OUTPATIENT
Start: 2021-07-27 | End: 2021-11-01

## 2021-07-27 NOTE — TELEPHONE ENCOUNTER
Medication:   Pending Prescriptions Disp Refills    TRULICITY 7.90 OS/4.6VE SOPN    2        Patient Phone Number: [phone] (home)     Last appt: 2/4/2021   Next appt: Visit date not found    Last OARRS:   RX Monitoring 8/30/2019   Attestation -   Periodic Controlled Substance Monitoring Possible medication side effects, risk of tolerance/dependence & alternative treatments discussed. ;No signs of potential drug abuse or diversion identified. ;Assessed functional status. ;Obtaining appropriate analgesic effect of treatment.        Last PDMP Ministerio Patton as Reviewed [facility]):  Review User Review Instant Review Result   Jade Gates 6/17/2020 10:33 AM Reviewed PDMP [1]     Preferred Pharmacy:   [address] [place] [address], [address] [address] [phone] - f [phone]

## 2021-09-22 DIAGNOSIS — M54.50 CHRONIC BILATERAL LOW BACK PAIN WITHOUT SCIATICA: ICD-10-CM

## 2021-09-22 DIAGNOSIS — G89.29 CHRONIC BILATERAL LOW BACK PAIN WITHOUT SCIATICA: ICD-10-CM

## 2021-09-22 RX ORDER — TRAMADOL HYDROCHLORIDE 50 MG/1
50 TABLET ORAL EVERY 6 HOURS PRN
Qty: 60 TABLET | Refills: 0 | OUTPATIENT
Start: 2021-09-22 | End: 2022-07-16

## 2021-09-22 NOTE — TELEPHONE ENCOUNTER
----- Message from [facility] sent at 9/21/2021 12:44 PM EDT -----  Subject: Refill Request    QUESTIONS  Name of Medication? traMADol (ULTRAM) 50 MG tablet  Patient-reported dosage and instructions? twice daily as needed. 50 mg  How many days do you have left? 4  Preferred Pharmacy? [id number]  Pharmacy phone number (if available)? [phone]  ---------------------------------------------------------------------------  --------------  Darmanda Boom INFO  What is the best way for the office to contact you? OK to leave message on   voicemail  Preferred Call Back Phone Number?  [phone]

## 2021-09-22 NOTE — TELEPHONE ENCOUNTER
Medication:   Requested Prescriptions     Pending Prescriptions Disp Refills    traMADol (ULTRAM) 50 MG tablet 60 tablet 0     Sig: Take 1 tablet by mouth every 6 hours as needed for Pain for up to 297 days. **Patient needs appointment for further refill**        Last Filled: 6/17/2020     Patient Phone Number: [phone] (home)     Last appt: 2/4/2021   Next appt: Visit date not found    Last OARRS:   RX Monitoring 8/30/2019   Attestation -   Periodic Controlled Substance Monitoring Possible medication side effects, risk of tolerance/dependence & alternative treatments discussed. ;No signs of potential drug abuse or diversion identified. ;Assessed functional status. ;Obtaining appropriate analgesic effect of treatment.

## 2021-10-05 ENCOUNTER — HOSPITAL ENCOUNTER (OUTPATIENT)
Dept: WOMENS IMAGING | Age: 61
Discharge: HOME | End: 2021-10-05
Payer: COMMERCIAL

## 2021-10-05 ENCOUNTER — OFFICE VISIT (OUTPATIENT)
Dept: SURGERY | Age: 61
End: 2021-10-05
Payer: COMMERCIAL

## 2021-10-05 VITALS
HEIGHT: 62 IN | BODY MASS INDEX: 29.26 KG/M2 | RESPIRATION RATE: 18 BRPM | OXYGEN SATURATION: 99 % | DIASTOLIC BLOOD PRESSURE: 81 MMHG | HEART RATE: 85 BPM | WEIGHT: 159 LBS | SYSTOLIC BLOOD PRESSURE: 131 MMHG

## 2021-10-05 VITALS — WEIGHT: 160 LBS | BODY MASS INDEX: 29.44 KG/M2 | HEIGHT: 62 IN

## 2021-10-05 DIAGNOSIS — Z12.39 ENCOUNTER FOR SCREENING BREAST EXAMINATION: Primary | ICD-10-CM

## 2021-10-05 DIAGNOSIS — Z85.3 ENCOUNTER FOR FOLLOW-UP SURVEILLANCE OF BREAST CANCER: ICD-10-CM

## 2021-10-05 DIAGNOSIS — Z90.11 S/P RIGHT MASTECTOMY: ICD-10-CM

## 2021-10-05 DIAGNOSIS — Z08 ENCOUNTER FOR FOLLOW-UP SURVEILLANCE OF BREAST CANCER: ICD-10-CM

## 2021-10-05 DIAGNOSIS — Z12.39 ENCOUNTER FOR SCREENING BREAST EXAMINATION: ICD-10-CM

## 2021-10-05 DIAGNOSIS — Z85.3 HISTORY OF BREAST CANCER: ICD-10-CM

## 2021-10-05 PROCEDURE — 77067 SCR MAMMO BI INCL CAD: CPT

## 2021-10-05 PROCEDURE — 99213 OFFICE O/P EST LOW 20 MIN: CPT | Performed by: NURSE PRACTITIONER

## 2021-10-05 NOTE — PROGRESS NOTES
Surgical Breast Oncology       CC: One year [id number] for breast check and mammogram     HPI: Alfonso Perea is a 62 y.o. woman here for annual follow up for breast check secondary to personal history of right breast cancer. She is s/p right mastectomy with SLNB. She is on tamoxifen and tolerating it well. She has no breast related concerns today. She states that she does perform routine self breast evaluations and has not noticed any new abnormalities such as masses, skin changes, color changes,nipple discharge, or changes to the nipple-areolar complex. Mammogram today reveals no concerning findings suggestive of malignancy. BI-RADS 2. INTERVAL HX:  On 7/20/2016 she underwent a right mastectomy with sentinel lymph node biopsy and immediate tissue expander based reconstruction. Pathology identified DCIS, grade 2, ER positive, IN positive. There were 0/6 lymph nodes involved with carcinoma. [id number].     Tamoxifen initiated.     On 9/21/2018 she underwent left breast screening mammography. There are no concerning findings suggestive of malignancy. BI-RADS 2.     On 9/23/2019 she underwent left screening mammography. There are stable findings noted. There are no new concerning findings suggestive of malignancy. BI-RADS2    On 9/29/2020 she underwent left screening mammography. There are stable findings noted. There are new concerning findings suggestive of malignancy. BI-RADS 2. On 10/5/2021 she underwent left screening mammography. Stable findings noted no new concerning findings suggestive of malignancy. BI-RADS 2.       Past Medical History:   Diagnosis Date    Cancer [facility])     right breast    Carpal tunnel syndrome     Colon polyps     Dizziness     Fatty liver     Headache(784.0)     Hyperlipidemia     Hypertension     Meniere disease     Migraines     Obesity     Optic atrophy     left    Papilloma of breast     bilateral    Thoracic back pain     Tinnitus  Type II or unspecified type diabetes mellitus without mention of complication, not stated as uncontrolled     Urge incontinence     Vertigo        Past Surgical History:   Procedure Laterality Date    BREAST BIOPSY Bilateral 06/15/2016    : RIGHT NEEDLE LOCALIZATION OF TWO SITES EXCISIONAL BREAST    BREAST SURGERY Bilateral 02/15/2017    SECOND STAGE RIGHT BREAST RECONSTRUCTION WITH PLACEMENT OF SILICONE BREAST IMPLANT ; left breast reduction    CARPAL TUNNEL RELEASE Right 1/24/2019    RIGHT CARPAL TUNNEL RELEASE performed by Jonny Lozano MD at [address] Right 07/20/2016     RIGHT SKIN [address] MASTECTOMY WITH Tc99 AND Mell Toyin,       Family History   Problem Relation Age of Onset    Arthritis Mother     Asthma Mother     Diabetes Mother     Heart Disease Mother     High Blood Pressure Mother     High Cholesterol Mother     Hypertension Mother     Osteoarthritis Mother     Arthritis Father     Asthma Father     Cancer Father     High Cholesterol Father     Diabetes Father     Hypertension Father     Asthma Son     Diabetes Sister        Allergies as of 10/05/2021    (No Known Allergies)       Social History     Tobacco Use    Smoking status: Former Smoker     Packs/day: 1.00     Years: 10.00     Pack years: 10.00    Smokeless tobacco: Never Used   Vaping Use    Vaping Use: Never used   Substance Use Topics    Alcohol use:  Yes     Alcohol/week: 0.0 standard drinks     Comment: occasional    Drug use: No       Current Outpatient Medications on File Prior to Visit   Medication Sig Dispense Refill    TRULICITY 2.08 RO/2.5NG SOPN INJECT 1 PEN ONCE WEEKLY 2 pen 2    atorvastatin (LIPITOR) 40 MG tablet TAKE 1 TABLET BY MOUTH EVERY DAY 90 tablet 1    traZODone (DESYREL) 50 MG tablet TAKE 1 TABLET BY MOUTH EVERY DAY AT BEDTIME AS NEEDED FOR SLEEP 90 tablet 1    lisinopril (PRINIVIL;ZESTRIL) 10 MG tablet TAKE 1 TABLET BY MOUTH EVERY DAY 90 tablet 3    diclofenac (VOLTAREN) 50 MG EC tablet TAKE 1 TABLET BY MOUTH 3 TIMES DAILY WITH MEALS 60 tablet 3    metFORMIN (GLUCOPHAGE) 1000 MG tablet Take 1 tablet by mouth 2 times daily (with meals) 180 tablet 1    oxybutynin (DITROPAN XL) 15 MG extended release tablet TAKE ONE TABLET BY MOUTH DAILY 90 tablet 5    linagliptin (TRADJENTA) 5 MG tablet TAKE ONE TABLET BY MOUTH DAILY 30 tablet 5    tamoxifen (NOLVADEX) 20 MG tablet TAKE ONE TABLET BY MOUTH DAILY 30 tablet 4    glimepiride (AMARYL) 4 MG tablet Take 2 tablets by mouth every morning (before breakfast) (Patient taking differently: Take 8 mg by mouth every morning (before breakfast) Pt states only taking 1 per day.) 180 tablet 3     No current facility-administered medications on file prior to visit. Medications: documentation has been reviewed in the electronic medical record and patient office intake form. REVIEW OF SYSTEMS:  Constitutional: Negative for fever  HENT: Negative for sore throat  Eyes: Negative for redness   Respiratory: Negative for dyspnea, cough  Cardiovascular: Negative for chest pain  Gastrointestinal: Negative for vomiting, diarrhea   Genitourinary: Negative for hematuria   Musculoskeletal: Negative for arthralgias   Skin: Negative for rash  Neurological: Negative for syncope  Hematological: Negative for adenopathy  Psychiatric/Behavorial: Negative for anxiety         PHYSICAL EXAM:  /81   Pulse 85   Resp 18   Ht 5' 2\" (1.575 m)   Wt 159 lb (72.1 kg)   SpO2 99%   BMI 29.08 kg/m²   Constitutional: She appearswell-nourished. No apparent distress. Breast: The patient was examined in the upright and supine position. Right: Right breast has been surgically removed. There is implant-based reconstruction in place. Well-healed scar. Well-healed ipsilateral axillary scar. Postsurgical changes noted. She has some redundant skin.   Projection is somewhat asymmetrical.  No new masses or changes in breast contour. No skin changes of the breast or nipple areolar complex. No nipple inversion or discharge. No erythema, thickening (peau d'orange), or dimpling. Left: Evidence of symmetry procedure. no new masses or changes in breast contour. No skin changes of the breast or nipple areolar complex. No nipple inversion or discharge. No erythema, thickening (peau d'orange), or dimpling. There is no axillary lymphadenopathy palpated bilaterally. Head: Normocephalic and atraumatic:   Eyes: EOM are normal. Pupils are equal, round, and reactive to light. Neck: Neck supple. No tracheal deviation present. No obvious mass. Lymphatics: No palpable supraclavicular, cervical, or axillary lymphadenopathy  Skin: No rash noted. No erythema. Neurologic: alert and oriented. Extremities: appear well perfused. No edema. No joint deformity      [id number] STAGE: 0 right breast cancer  ER/OK positive     ASSESSMENT:  - Screening Breast Examination - stable breast examination and stable bilateral screening mammogram.    - Personal History of Breast Cancer -s/p right mastectomy with SLNB 2016, s/p right breast implant-based reconstruction, s/p left reduction symmetry procedure. - Endocrine therapy - Tamoxifen   - Encounter for follow-up surveillance of breast cancer   -History of left breast excisional biopsy - papilloma 2016         PLAN:    Continue annual screening mammography with tomosynthesis   Continue annual clinical breast exams    Continue endocrine therapy per medical oncology    Signs/symptoms of recurrence were reviewed. She verbalizes understanding that she should notify the office if she identifies any abnormalities on self evaluation.    Follow up cancer surveillance discussed    Discussed the importance of breast awareness including the importance and technique of self breast exams   Today's imaging was reviewed and the results were discussed with the patient, all questions answered   Education provided for Healthy Lifestyle Recommendations: healthy diet, routine exercise, weight control, decreased alcohol consumption.  Follow up after annual mammogram in 1 year, due October 2022          Emily Todd 4   Surgical Breast Oncology   [phone]    All of the patient's questions were answered at this time however, she was encouraged to call the office with any further inquiries. Approximately 20 minutes of time were spent in preparation, direct patient contact, counseling, care coordination, documentation and activities otherwise related to this encounter.

## 2021-10-05 NOTE — PATIENT INSTRUCTIONS
Healthy Lifestyle Recommendations: healthy diet (decrease consumption of red meat, increase fresh fruits and vegetables), decreased alcohol consumption (less than 4 drinks/week), adequate sleep (goal 6-8 hours), routine exercise (goal 150 minutes/week or greater), weight control. Patient Education        Breast Self-Exam: Care Instructions  Your Care Instructions     A breast self-exam is when you check your breasts for lumps or changes. This regular exam helps you learn how your breasts normally look and feel. Most breast problems or changes are not because of cancer. Breast self-exam is not a substitute for a mammogram. Having regular breast exams by your doctor and regular mammograms improve your chances of finding any problems with your breasts. Some women set a time each month to do a step-by-step breast self-exam. Other women like a less formal system. They might look at their breasts as they brush their teeth, or feel their breasts once in a while in the shower. If you notice a change in your breast, tell your doctor. Follow-up care is a key part of your treatment and safety. Be sure to make and go to all appointments, and call your doctor if you are having problems. It's also a good idea to know your test results and keep a list of the medicines you take. How do you do a breast self-exam?  · The best time to examine your breasts is usually one week after your menstrual period begins. Your breasts should not be tender then. If you do not have periods, you might do your exam on a day of the month that is easy to remember. · To examine your breasts:  ? Remove all your clothes above the waist and lie down. When you are lying down, your breast tissue spreads evenly over your chest wall, which makes it easier to feel all your breast tissue. ?  Use the pads--not the fingertips--of the 3 middle fingers of your left hand to check your right breast. Move your fingers slowly in small coin-sized circles that overlap. ? Use three levels of pressure to feel of all your breast tissue. Use light pressure to feel the tissue close to the skin surface. Use medium pressure to feel a little deeper. Use firm pressure to feel your tissue close to your breastbone and ribs. Use each pressure level to feel your breast tissue before moving on to the next spot. ? Check your entire breast, moving up and down as if following a strip from the collarbone to the bra line, and from the armpit to the ribs. Repeat until you have covered the entire breast.  ? Repeat this procedure for your left breast, using the pads of the 3 middle fingers of your right hand. · To examine your breasts while in the shower:  ? Place one arm over your head and lightly soap your breast on that side. ? Using the pads of your fingers, gently move your hand over your breast (in the strip pattern described above), feeling carefully for any lumps or changes. ? Repeat for the other breast.  · Have your doctor inspect anything you notice to see if you need further testing. Where can you learn more? Go to https://[employer]. org and sign in to your [employer] account. Enter P148 in the "[employer] box to learn more about \"Breast Self-Exam: Care Instructions. \"     If you do not have an account, please click on the \"Sign Up Now\" link. Current as of: December 17, 2020               Content Version: 13.0  © 5457-4992 [employer]wise, Incorporated. Care instructions adapted under license by [facility] ([facility]). If you have questions about a medical condition or this instruction, always ask your healthcare professional. [place] [address] any warranty or liability for your use of this information.

## 2021-10-19 ENCOUNTER — OFFICE VISIT (OUTPATIENT)
Dept: FAMILY MEDICINE CLINIC | Age: 61
End: 2021-10-19
Payer: COMMERCIAL

## 2021-10-19 ENCOUNTER — HOSPITAL ENCOUNTER (OUTPATIENT)
Dept: GENERAL RADIOLOGY | Age: 61
Discharge: HOME | End: 2021-10-19
Payer: COMMERCIAL

## 2021-10-19 ENCOUNTER — HOSPITAL ENCOUNTER (OUTPATIENT)
Age: 61
Discharge: HOME | End: 2021-10-19
Payer: COMMERCIAL

## 2021-10-19 VITALS
HEART RATE: 84 BPM | SYSTOLIC BLOOD PRESSURE: 139 MMHG | WEIGHT: 161 LBS | DIASTOLIC BLOOD PRESSURE: 86 MMHG | TEMPERATURE: 97.8 F | OXYGEN SATURATION: 98 % | BODY MASS INDEX: 29.45 KG/M2

## 2021-10-19 DIAGNOSIS — M25.511 CHRONIC RIGHT SHOULDER PAIN: ICD-10-CM

## 2021-10-19 DIAGNOSIS — I10 ESSENTIAL HYPERTENSION: ICD-10-CM

## 2021-10-19 DIAGNOSIS — G89.29 CHRONIC RIGHT SHOULDER PAIN: ICD-10-CM

## 2021-10-19 DIAGNOSIS — E11.9 TYPE 2 DIABETES MELLITUS WITHOUT COMPLICATION, WITHOUT LONG-TERM CURRENT USE OF INSULIN (HCC): Primary | ICD-10-CM

## 2021-10-19 DIAGNOSIS — G47.00 INSOMNIA, UNSPECIFIED TYPE: ICD-10-CM

## 2021-10-19 DIAGNOSIS — Z23 NEED FOR VACCINATION: ICD-10-CM

## 2021-10-19 PROCEDURE — 99214 OFFICE O/P EST MOD 30 MIN: CPT | Performed by: NURSE PRACTITIONER

## 2021-10-19 PROCEDURE — 3051F HG A1C>EQUAL 7.0%<8.0%: CPT | Performed by: NURSE PRACTITIONER

## 2021-10-19 PROCEDURE — 73030 X-RAY EXAM OF SHOULDER: CPT

## 2021-10-19 PROCEDURE — 90471 IMMUNIZATION ADMIN: CPT | Performed by: NURSE PRACTITIONER

## 2021-10-19 PROCEDURE — 90756 CCIIV4 VACC ABX FREE IM: CPT | Performed by: NURSE PRACTITIONER

## 2021-10-19 RX ORDER — LISINOPRIL 10 MG/1
TABLET ORAL
Qty: 90 TABLET | Refills: 3 | Status: SHIPPED | OUTPATIENT
Start: 2021-10-19 | End: 2022-08-11

## 2021-10-19 RX ORDER — TRAZODONE HYDROCHLORIDE 50 MG/1
TABLET ORAL
Qty: 90 TABLET | Refills: 1 | Status: SHIPPED | OUTPATIENT
Start: 2021-10-19 | End: 2022-04-19

## 2021-10-19 NOTE — PROGRESS NOTES
Cristino Diane  : 1960  Encounter date: 10/19/2021    This yosvany 62 y.o. female who presents with  Chief Complaint   Patient presents with    6 Month Follow-Up     DM/HTN/       History of present illness:    HPI Pt is 62year old female for follow up on diabetes and hypertension. Pt has not had labs completed. Reports blood sugars run fasting - 140, currently taking tradjenta 5 mg , trulicity 7.68, and metformin 1000 mg BID. Pt reports taking glimeperide once daily. Pt concerned about right shoulder pain x 2 months. Denies known trauma or twisting or repetitive movement. Pt has not had imaging completed. Pt has taken tramadol with little relief. Current Outpatient Medications on File Prior to Visit   Medication Sig Dispense Refill    TRULICITY 3.06 OZ/5.7DA SOPN INJECT 1 PEN ONCE WEEKLY 2 pen 2    atorvastatin (LIPITOR) 40 MG tablet TAKE 1 TABLET BY MOUTH EVERY DAY 90 tablet 1    diclofenac (VOLTAREN) 50 MG EC tablet TAKE 1 TABLET BY MOUTH 3 TIMES DAILY WITH MEALS 60 tablet 3    metFORMIN (GLUCOPHAGE) 1000 MG tablet Take 1 tablet by mouth 2 times daily (with meals) 180 tablet 1    oxybutynin (DITROPAN XL) 15 MG extended release tablet TAKE ONE TABLET BY MOUTH DAILY 90 tablet 5    linagliptin (TRADJENTA) 5 MG tablet TAKE ONE TABLET BY MOUTH DAILY 30 tablet 5    tamoxifen (NOLVADEX) 20 MG tablet TAKE ONE TABLET BY MOUTH DAILY 30 tablet 4    glimepiride (AMARYL) 4 MG tablet Take 2 tablets by mouth every morning (before breakfast) (Patient taking differently: Take 8 mg by mouth every morning (before breakfast) Pt states only taking 1 per day.) 180 tablet 3     No current facility-administered medications on file prior to visit.       No Known Allergies  Past Medical History:   Diagnosis Date    Cancer [facility])     right breast    Carpal tunnel syndrome     Colon polyps     Dizziness     Fatty liver     Headache(784.0)     Hyperlipidemia     Hypertension     Meniere disease     Migraines  Obesity     Optic atrophy     left    Papilloma of breast     bilateral    Thoracic back pain     Tinnitus     Type II or unspecified type diabetes mellitus without mention of complication, not stated as uncontrolled     Urge incontinence     Vertigo       Past Surgical History:   Procedure Laterality Date    BREAST BIOPSY Bilateral 06/15/2016    : RIGHT NEEDLE LOCALIZATION OF TWO SITES EXCISIONAL BREAST    BREAST SURGERY Bilateral 02/15/2017    SECOND STAGE RIGHT BREAST RECONSTRUCTION WITH PLACEMENT OF SILICONE BREAST IMPLANT ; left breast reduction    CARPAL TUNNEL RELEASE Right 1/24/2019    RIGHT CARPAL TUNNEL RELEASE performed by Mariana Nj MD at [address] Right 07/20/2016     RIGHT SKIN [address] MASTECTOMY WITH Tc99 AND [place],      Family History   Problem Relation Age of Onset    Arthritis Mother     Asthma Mother     Diabetes Mother     Heart Disease Mother     High Blood Pressure Mother     High Cholesterol Mother     Hypertension Mother     Osteoarthritis Mother     Arthritis Father     Asthma Father     Cancer Father     High Cholesterol Father     Diabetes Father     Hypertension Father     Asthma Son     Diabetes Sister       Social History     Tobacco Use    Smoking status: Former Smoker     Packs/day: 1.00     Years: 10.00     Pack years: 10.00    Smokeless tobacco: Never Used   Substance Use Topics    Alcohol use:  Yes     Alcohol/week: 0.0 standard drinks     Comment: occasional        Review of Systems    Objective:    BP (!) 148/88 (Site: Left Upper Arm, Position: Sitting, Cuff Size: Medium Adult)   Pulse 84   Temp 97.8 °F (36.6 °C)   Wt 161 lb (73 kg)   SpO2 98%   BMI 29.45 kg/m²   Weight: 161 lb (73 kg)     BP Readings from Last 3 Encounters:   10/19/21 (!) 148/88   10/05/21 131/81   02/04/21 126/84     Wt Readings from Last 3 Encounters:   10/19/21 161 lb (73 kg)   10/05/21 160 lb (72.6 kg)   10/05/21 159 lb (72.1 kg)     BMI Readings from Last 3 Encounters:   10/19/21 29.45 kg/m²   10/05/21 29.26 kg/m²   10/05/21 29.08 kg/m²       Physical Exam  Vitals reviewed. Constitutional:       Appearance: Normal appearance. She is well-developed. She is obese. Cardiovascular:      Rate and Rhythm: Normal rate and regular rhythm. Pulses: Normal pulses. Heart sounds: No murmur heard. Pulmonary:      Effort: Pulmonary effort is normal.      Breath sounds: Normal breath sounds. Musculoskeletal:         General: Tenderness (R shoulder, decreased ROM, decreased strength, pain with palpation) present. No signs of injury. Right lower leg: No edema. Left lower leg: No edema. Skin:     General: Skin is warm and dry. Neurological:      Mental Status: She is alert and oriented to person, place, and time. Assessment/Plan    1. Type 2 diabetes mellitus without complication, without long-term current use of insulin ([facility])  Advised to have labs completed  - Hemoglobin A1C; Future  - Lipid, Fasting; Future    2. Essential hypertension  Controlled  - lisinopril (PRINIVIL;ZESTRIL) 10 MG tablet; TAKE 1 TABLET BY MOUTH EVERY DAY  Dispense: 90 tablet; Refill: 3  - CBC Auto Differential; Future  - Comprehensive Metabolic Panel, Fasting; Future  - Microalbumin / Creatinine Urine Ratio; Future    3. Insomnia, unspecified type  Controlled  - traZODone (DESYREL) 50 MG tablet; TAKE 1 TABLET BY MOUTH EVERY DAY AT BEDTIME AS NEEDED FOR SLEEP  Dispense: 90 tablet; Refill: 1    4. Chronic right shoulder pain  Discussed possible MRI, orthopedic referral  Advised diclofenac TID  ice  - XR SHOULDER RIGHT (MIN 2 VIEWS); Future    5. Need for vaccination  Administered   - INFLUENZA, MDCK QUADV, 2 YRS AND OLDER, IM, MDV, 0.5ML (FLUCELVAX QUADV)      No follow-ups on file. This dictation was generated by voice recognition computer software.   Although all attempts are made to edit the dictation for accuracy, there may be errors in the transcription that are not intended.

## 2021-10-20 DIAGNOSIS — G89.29 CHRONIC RIGHT SHOULDER PAIN: Primary | ICD-10-CM

## 2021-10-20 DIAGNOSIS — M19.011 LOCALIZED OSTEOARTHRITIS OF RIGHT SHOULDER: ICD-10-CM

## 2021-10-20 DIAGNOSIS — M25.511 CHRONIC RIGHT SHOULDER PAIN: Primary | ICD-10-CM

## 2021-10-28 ENCOUNTER — HOSPITAL ENCOUNTER (OUTPATIENT)
Age: 61
Discharge: HOME | End: 2021-10-28
Payer: COMMERCIAL

## 2021-10-28 ENCOUNTER — OFFICE VISIT (OUTPATIENT)
Dept: ORTHOPEDIC SURGERY | Age: 61
End: 2021-10-28
Payer: COMMERCIAL

## 2021-10-28 VITALS — HEIGHT: 62 IN | WEIGHT: 161 LBS | BODY MASS INDEX: 29.63 KG/M2

## 2021-10-28 DIAGNOSIS — E11.9 TYPE 2 DIABETES MELLITUS WITHOUT COMPLICATION, WITHOUT LONG-TERM CURRENT USE OF INSULIN (HCC): ICD-10-CM

## 2021-10-28 DIAGNOSIS — M25.511 RIGHT SHOULDER PAIN, UNSPECIFIED CHRONICITY: Primary | ICD-10-CM

## 2021-10-28 DIAGNOSIS — I10 ESSENTIAL HYPERTENSION: ICD-10-CM

## 2021-10-28 LAB
A/G RATIO: 1.3 (ref 1.1–2.2)
ALBUMIN SERPL-MCNC: 4.4 G/DL (ref 3.4–5)
ALP BLD-CCNC: 138 U/L (ref 40–129)
ALT SERPL-CCNC: 18 U/L (ref 10–40)
ANION GAP SERPL CALCULATED.3IONS-SCNC: 15 MMOL/L (ref 3–16)
AST SERPL-CCNC: 16 U/L (ref 15–37)
BASOPHILS ABSOLUTE: 0.1 K/UL (ref 0–0.2)
BASOPHILS RELATIVE PERCENT: 1.1 %
BILIRUB SERPL-MCNC: 0.3 MG/DL (ref 0–1)
BUN BLDV-MCNC: 13 MG/DL (ref 7–20)
CALCIUM SERPL-MCNC: 10 MG/DL (ref 8.3–10.6)
CHLORIDE BLD-SCNC: 101 MMOL/L (ref 99–110)
CHOLESTEROL, FASTING: 225 MG/DL (ref 0–199)
CO2: 25 MMOL/L (ref 21–32)
CREAT SERPL-MCNC: 0.7 MG/DL (ref 0.6–1.1)
CREATININE URINE: 61 MG/DL (ref 28–259)
EOSINOPHILS ABSOLUTE: 0.1 K/UL (ref 0–0.6)
EOSINOPHILS RELATIVE PERCENT: 1.7 %
ESTIMATED AVERAGE GLUCOSE: 220.2 MG/DL
GFR AFRICAN AMERICAN: >60
GFR NON-AFRICAN AMERICAN: >60
GLUCOSE FASTING: 174 MG/DL (ref 70–99)
HBA1C MFR BLD: 9.3 %
HCT VFR BLD CALC: 45.8 % (ref 36–48)
HDLC SERPL-MCNC: 55 MG/DL (ref 40–60)
HEMOGLOBIN: 15.3 G/DL (ref 12–16)
LDL CHOLESTEROL CALCULATED: 137 MG/DL
LYMPHOCYTES ABSOLUTE: 4 K/UL (ref 1–5.1)
LYMPHOCYTES RELATIVE PERCENT: 47.5 %
MCH RBC QN AUTO: 28.6 PG (ref 26–34)
MCHC RBC AUTO-ENTMCNC: 33.4 G/DL (ref 31–36)
MCV RBC AUTO: 85.5 FL (ref 80–100)
MICROALBUMIN UR-MCNC: 1.8 MG/DL
MICROALBUMIN/CREAT UR-RTO: 29.5 MG/G (ref 0–30)
MONOCYTES ABSOLUTE: 0.6 K/UL (ref 0–1.3)
MONOCYTES RELATIVE PERCENT: 7.3 %
NEUTROPHILS ABSOLUTE: 3.6 K/UL (ref 1.7–7.7)
NEUTROPHILS RELATIVE PERCENT: 42.4 %
PDW BLD-RTO: 13.3 % (ref 12.4–15.4)
PLATELET # BLD: 345 K/UL (ref 135–450)
PMV BLD AUTO: 8.6 FL (ref 5–10.5)
POTASSIUM SERPL-SCNC: 3.8 MMOL/L (ref 3.5–5.1)
RBC # BLD: 5.36 M/UL (ref 4–5.2)
SODIUM BLD-SCNC: 141 MMOL/L (ref 136–145)
TOTAL PROTEIN: 7.9 G/DL (ref 6.4–8.2)
TRIGLYCERIDE, FASTING: 166 MG/DL (ref 0–150)
VLDLC SERPL CALC-MCNC: 33 MG/DL
WBC # BLD: 8.4 K/UL (ref 4–11)

## 2021-10-28 PROCEDURE — 83036 HEMOGLOBIN GLYCOSYLATED A1C: CPT

## 2021-10-28 PROCEDURE — 80061 LIPID PANEL: CPT

## 2021-10-28 PROCEDURE — 99204 OFFICE O/P NEW MOD 45 MIN: CPT | Performed by: ORTHOPAEDIC SURGERY

## 2021-10-28 PROCEDURE — 82043 UR ALBUMIN QUANTITATIVE: CPT

## 2021-10-28 PROCEDURE — 85025 COMPLETE CBC W/AUTO DIFF WBC: CPT

## 2021-10-28 PROCEDURE — 80053 COMPREHEN METABOLIC PANEL: CPT

## 2021-10-28 PROCEDURE — 82570 ASSAY OF URINE CREATININE: CPT

## 2021-10-28 RX ORDER — METHYLPREDNISOLONE 4 MG/1
TABLET ORAL
Qty: 1 KIT | Refills: 0 | Status: SHIPPED | OUTPATIENT
Start: 2021-10-28 | End: 2021-11-03

## 2021-10-28 NOTE — PROGRESS NOTES
10/28/2021     Reason for visit:  Right shoulder pain    History of Present Illness: The patient is a 56-year-old female who presents for evaluation of her right shoulder. She reports 3 months of pain. The pain is gotten progressively worse. The pain is diffuse about the shoulder. She has difficulty with overhead activities, reaching, and lifting. She also reports pain at night while sleeping. Has been treated with activity modification, anti-inflammatory medications, and a home therapy regiment but has remained symptomatic.     Medical History:  Past Medical History:   Diagnosis Date    Cancer [facility])     right breast    Carpal tunnel syndrome     Colon polyps     Dizziness     Fatty liver     Headache(784.0)     Hyperlipidemia     Hypertension     Meniere disease     Migraines     Obesity     Optic atrophy     left    Papilloma of breast     bilateral    Thoracic back pain     Tinnitus     Type II or unspecified type diabetes mellitus without mention of complication, not stated as uncontrolled     Urge incontinence     Vertigo       Past Surgical History:   Procedure Laterality Date    BREAST BIOPSY Bilateral 06/15/2016    : RIGHT NEEDLE LOCALIZATION OF TWO SITES EXCISIONAL BREAST    BREAST SURGERY Bilateral 02/15/2017    SECOND STAGE RIGHT BREAST RECONSTRUCTION WITH PLACEMENT OF SILICONE BREAST IMPLANT ; left breast reduction    CARPAL TUNNEL RELEASE Right 1/24/2019    RIGHT CARPAL TUNNEL RELEASE performed by Kurt Bliss MD at [address] Right 07/20/2016     RIGHT SKIN [address] MASTECTOMY WITH Tc99 AND Nevaeh Page,      Family History   Problem Relation Age of Onset    Arthritis Mother     Asthma Mother     Diabetes Mother     Heart Disease Mother     High Blood Pressure Mother     High Cholesterol Mother     Hypertension Mother     Osteoarthritis Mother     Arthritis Father     Asthma Father     Cancer Father     High Cholesterol Father     Diabetes Father     Hypertension Father     Asthma Son     Diabetes Sister       Social History     Socioeconomic History    Marital status:      Spouse name: Not on file    Number of children: Not on file    Years of education: Not on file    Highest education level: Not on file   Occupational History    Not on file   Tobacco Use    Smoking status: Former Smoker     Packs/day: 1.00     Years: 10.00     Pack years: 10.00    Smokeless tobacco: Never Used   Vaping Use    Vaping Use: Never used   Substance and Sexual Activity    Alcohol use: Yes     Alcohol/week: 0.0 standard drinks     Comment: occasional    Drug use: No    Sexual activity: Not on file   Other Topics Concern    Not on file   Social History Narrative    Not on file     Social Determinants of Health     Financial Resource Strain:     Difficulty of Paying Living Expenses:    Food Insecurity:     Worried About Running Out of Food in the Last Year:     [address] in the Last Year:    Transportation Needs:     Lack of Transportation (Medical):      Lack of Transportation (Non-Medical):    Physical Activity:     Days of Exercise per Week:     Minutes of Exercise per Session:    Stress:     Feeling of Stress :    Social Connections:     Frequency of Communication with Friends and Family:     Frequency of Social Gatherings with Friends and Family:     Attends Yazdanism Services:     Active Member of Clubs or Organizations:     Attends Club or Organization Meetings:     Marital Status:    Intimate Partner Violence:     Fear of Current or Ex-Partner:     Emotionally Abused:     Physically Abused:     Sexually Abused:       Current Outpatient Medications on File Prior to Visit   Medication Sig Dispense Refill    lisinopril (PRINIVIL;ZESTRIL) 10 MG tablet TAKE 1 TABLET BY MOUTH EVERY DAY 90 tablet 3    traZODone (DESYREL) 50 MG tablet TAKE 1 TABLET BY MOUTH EVERY DAY AT BEDTIME AS NEEDED FOR SLEEP 90 tablet 1    TRULICITY 5.51 FS/2.3AW SOPN INJECT 1 PEN ONCE WEEKLY 2 pen 2    atorvastatin (LIPITOR) 40 MG tablet TAKE 1 TABLET BY MOUTH EVERY DAY 90 tablet 1    diclofenac (VOLTAREN) 50 MG EC tablet TAKE 1 TABLET BY MOUTH 3 TIMES DAILY WITH MEALS 60 tablet 3    metFORMIN (GLUCOPHAGE) 1000 MG tablet Take 1 tablet by mouth 2 times daily (with meals) 180 tablet 1    oxybutynin (DITROPAN XL) 15 MG extended release tablet TAKE ONE TABLET BY MOUTH DAILY 90 tablet 5    linagliptin (TRADJENTA) 5 MG tablet TAKE ONE TABLET BY MOUTH DAILY 30 tablet 5    glimepiride (AMARYL) 4 MG tablet Take 2 tablets by mouth every morning (before breakfast) (Patient taking differently: Take 8 mg by mouth every morning (before breakfast) Pt states only taking 1 per day.) 180 tablet 3    tamoxifen (NOLVADEX) 20 MG tablet TAKE ONE TABLET BY MOUTH DAILY 30 tablet 4     No current facility-administered medications on file prior to visit. No Known Allergies     Review of Systems:  Constitutional: Patient is adequately groomed with no evidence of malnutrition  Mental Status: The patient is oriented to time, place and person. The patient's mood and affect are appropriate. Lymphatic: The lymphatic examination bilaterally reveals all areas to be without enlargement or induration. Vascular: Examination reveals no swelling or calf tenderness. Peripheral pulses are palpable and 2+. Neurological: The patient has good coordination. There is no weakness or sensory deficit. Skin:  Head/Neck: inspection reveals no rashes, ulcerations or lesions. Trunk: inspection reveals no rashes, ulcerations or lesions.     Objective:  Ht 5' 2\" (1.575 m)   Wt 161 lb (73 kg)   BMI 29.45 kg/m²      Physical Exam:  The patient is well-appearing and in no apparent distress  Neg Spurling's test  Examination of the right shoulder  There is no swelling, ecchymosis, or gross deformity  There is no evidence of muscle atrophy  + Kirby test, + Neers test  neg bicipital groove tenderness, neg AC joint tenderness  Range of motion reveals 90 degrees of forward flexion, 90 degrees of abduction, 30 degrees of external rotation, internal rotation to lumbar spine  4+/5 strength with resisted abduction, 4+/5 strength with resisted external rotation, 5/5 strength with resisted internal rotation  Intact motor and sensory function throughout the median/radial/ulnar/PIN/AIN distributions  Palpable radial pulse, brisk cap refill, 2+ symmetric reflexes    Imaging:  X-rays of the right shoulder were reviewed. There is no fracture or dislocation. Degenerative changes of acromioclavicular joint present. Assessment:  Right shoulder pain. Suspect adhesive capsulitis. Less likely is rotator cuff tear but possible    Plan:  I discussed with the patient the suspected diagnosis. At this point I would recommend an MRI to rule out rotator cuff pathology. However it is most likely that adhesive capsulitis is causing her pain. Therefore we will prescribe a Medrol Dosepak. She was instructed to watch her glucose level since she is diabetic. We will also coordinate an ultrasound-guided cortisone injection. We will refrain from formal physical therapy and instead gentle home exercises were instructed to her. She will return following the MRI. Greater than 45 minutes were spent with this encounter. Time spent included evaluating the patient's chart prior to arrival.  Evaluating the patient in the office including history, physical examination, imaging reviewing, and counseling on next steps. Lastly, time was spent discussing orders with my staff as well as providing documentation in the chart. Kapil Coello MD            Orthopaedic Surgery Sports [facility] [address] and [facility]            Team Physician [facility] ([place]      Disclaimer:   This note was dictated with voice recognition software. Though review and correction are routine, we apologize for any errors.

## 2021-11-01 DIAGNOSIS — E11.9 TYPE 2 DIABETES MELLITUS WITHOUT COMPLICATION, WITHOUT LONG-TERM CURRENT USE OF INSULIN (HCC): Primary | ICD-10-CM

## 2021-11-01 DIAGNOSIS — E78.49 OTHER HYPERLIPIDEMIA: ICD-10-CM

## 2021-11-01 RX ORDER — ATORVASTATIN CALCIUM 80 MG/1
80 TABLET, FILM COATED ORAL DAILY
Qty: 90 TABLET | Refills: 1 | Status: SHIPPED | OUTPATIENT
Start: 2021-11-01 | End: 2022-05-05 | Stop reason: ALTCHOICE

## 2021-11-01 RX ORDER — DULAGLUTIDE 1.5 MG/.5ML
1.5 INJECTION, SOLUTION SUBCUTANEOUS
Qty: 4 PEN | Refills: 2 | Status: SHIPPED | OUTPATIENT
Start: 2021-11-01 | End: 2022-01-20

## 2021-11-02 ENCOUNTER — OFFICE VISIT (OUTPATIENT)
Dept: ORTHOPEDIC SURGERY | Age: 61
End: 2021-11-02
Payer: COMMERCIAL

## 2021-11-02 VITALS — BODY MASS INDEX: 29.63 KG/M2 | HEIGHT: 62 IN | WEIGHT: 161 LBS

## 2021-11-02 DIAGNOSIS — M25.511 RIGHT SHOULDER PAIN, UNSPECIFIED CHRONICITY: ICD-10-CM

## 2021-11-02 DIAGNOSIS — E11.9 TYPE 2 DIABETES MELLITUS WITHOUT COMPLICATION, WITHOUT LONG-TERM CURRENT USE OF INSULIN (HCC): ICD-10-CM

## 2021-11-02 DIAGNOSIS — M75.01 ADHESIVE CAPSULITIS OF RIGHT SHOULDER: ICD-10-CM

## 2021-11-02 PROCEDURE — 99202 OFFICE O/P NEW SF 15 MIN: CPT | Performed by: INTERNAL MEDICINE

## 2021-11-02 RX ORDER — BUPIVACAINE HYDROCHLORIDE 2.5 MG/ML
3 INJECTION, SOLUTION INFILTRATION; PERINEURAL ONCE
Status: COMPLETED | OUTPATIENT
Start: 2021-11-02 | End: 2021-11-02

## 2021-11-02 RX ORDER — METHYLPREDNISOLONE ACETATE 40 MG/ML
40 INJECTION, SUSPENSION INTRA-ARTICULAR; INTRALESIONAL; INTRAMUSCULAR; SOFT TISSUE ONCE
Status: COMPLETED | OUTPATIENT
Start: 2021-11-02 | End: 2021-11-02

## 2021-11-02 RX ORDER — LIDOCAINE HYDROCHLORIDE 10 MG/ML
5 INJECTION, SOLUTION INFILTRATION; PERINEURAL ONCE
Status: COMPLETED | OUTPATIENT
Start: 2021-11-02 | End: 2021-11-02

## 2021-11-02 RX ADMIN — METHYLPREDNISOLONE ACETATE 40 MG: 40 INJECTION, SUSPENSION INTRA-ARTICULAR; INTRALESIONAL; INTRAMUSCULAR; SOFT TISSUE at 15:04

## 2021-11-02 RX ADMIN — LIDOCAINE HYDROCHLORIDE 5 ML: 10 INJECTION, SOLUTION INFILTRATION; PERINEURAL at 15:03

## 2021-11-02 RX ADMIN — BUPIVACAINE HYDROCHLORIDE 7.5 MG: 2.5 INJECTION, SOLUTION INFILTRATION; PERINEURAL at 15:03

## 2021-11-02 NOTE — PROGRESS NOTES
Chief Complaint:   Chief Complaint   Patient presents with    Shoulder Pain     R shoulder pain for 4 mos, with rest and with movement           History of Present Illness:       Patient is a 62 y.o. female presents with the above complaint. She is seen in consultation at the request of Dr. Jacques Velasquez consideration of ultrasound-guided intra-articular glenohumeral joint injection for the working diagnosis of adhesive capsulitis right shoulder. The symptoms began 3 monthsago and started without an injury. The patient describes a sharp pain that does not radiate. The symptoms are constant  and are show no change since the onset. Past medical history significant for type 2 diabetes mellitus. The symptoms do not show a typical rotator cuff provacative pattern. The pain is  diffuse about the shoulder. There are not associated mechanical symptoms localizing to the shoulder. The patient denies symptoms of instability about the shoulder. Pain levels 7. The symptoms do not correlate with head and neck movement. The patient does not have history of prior shoulder trauma. There is no history or autoimmune disease, inflammatory arthropathy or crystal arthropathy.            Past Medical History:        Past Medical History:   Diagnosis Date    Cancer [facility])     right breast    Carpal tunnel syndrome     Colon polyps     Dizziness     Fatty liver     Headache(784.0)     Hyperlipidemia     Hypertension     Meniere disease     Migraines     Obesity     Optic atrophy     left    Papilloma of breast     bilateral    Thoracic back pain     Tinnitus     Type II or unspecified type diabetes mellitus without mention of complication, not stated as uncontrolled     Urge incontinence     Vertigo          Past Surgical History:   Procedure Laterality Date    BREAST BIOPSY Bilateral 06/15/2016    : RIGHT NEEDLE LOCALIZATION OF TWO SITES EXCISIONAL BREAST    BREAST SURGERY Bilateral 02/15/2017    SECOND STAGE RIGHT BREAST RECONSTRUCTION WITH PLACEMENT OF SILICONE BREAST IMPLANT ; left breast reduction    CARPAL TUNNEL RELEASE Right 1/24/2019    RIGHT CARPAL TUNNEL RELEASE performed by Sriram Ernst MD at [address] Right 07/20/2016     RIGHT SKIN SAPARING MASTECTOMY WITH Tc99 AND METHYLANE BLUE,         Present Medications:         Current Outpatient Medications   Medication Sig Dispense Refill    Dulaglutide (TRULICITY) 1.5 YK/1.9GD SOPN Inject 1.5 mg into the skin every 7 days 4 pen 2    atorvastatin (LIPITOR) 80 MG tablet Take 1 tablet by mouth daily 90 tablet 1    methylPREDNISolone (MEDROL, STACY,) 4 MG tablet Take by mouth.  1 kit 0    lisinopril (PRINIVIL;ZESTRIL) 10 MG tablet TAKE 1 TABLET BY MOUTH EVERY DAY 90 tablet 3    traZODone (DESYREL) 50 MG tablet TAKE 1 TABLET BY MOUTH EVERY DAY AT BEDTIME AS NEEDED FOR SLEEP 90 tablet 1    diclofenac (VOLTAREN) 50 MG EC tablet TAKE 1 TABLET BY MOUTH 3 TIMES DAILY WITH MEALS 60 tablet 3    metFORMIN (GLUCOPHAGE) 1000 MG tablet Take 1 tablet by mouth 2 times daily (with meals) 180 tablet 1    oxybutynin (DITROPAN XL) 15 MG extended release tablet TAKE ONE TABLET BY MOUTH DAILY 90 tablet 5    linagliptin (TRADJENTA) 5 MG tablet TAKE ONE TABLET BY MOUTH DAILY 30 tablet 5    glimepiride (AMARYL) 4 MG tablet Take 2 tablets by mouth every morning (before breakfast) (Patient taking differently: Take 8 mg by mouth every morning (before breakfast) Pt states only taking 1 per day.) 180 tablet 3    tamoxifen (NOLVADEX) 20 MG tablet TAKE ONE TABLET BY MOUTH DAILY 30 tablet 4     Current Facility-Administered Medications   Medication Dose Route Frequency Provider Last Rate Last Admin    methylPREDNISolone acetate (DEPO-MEDROL) injection 40 mg  40 mg Intra-artICUlar Once Darylene Darner, MD        bupivacaine (MARCAINE) 0.25 % injection 7.5 mg  3 mL Intra-artICUlar Once Darylene Darner, MD  lidocaine 1 % injection 5 mL  5 mL SubCUTAneous Once Evin Prajapati MD             Allergies:      No Known Allergies     Social History:         Social History     Socioeconomic History    Marital status:      Spouse name: Not on file    Number of children: Not on file    Years of education: Not on file    Highest education level: Not on file   Occupational History    Not on file   Tobacco Use    Smoking status: Former Smoker     Packs/day: 1.00     Years: 10.00     Pack years: 10.00    Smokeless tobacco: Never Used   Vaping Use    Vaping Use: Never used   Substance and Sexual Activity    Alcohol use: Yes     Alcohol/week: 0.0 standard drinks     Comment: occasional    Drug use: No    Sexual activity: Not on file   Other Topics Concern    Not on file   Social History Narrative    Not on file     Social Determinants of Health     Financial Resource Strain:     Difficulty of Paying Living Expenses:    Food Insecurity:     Worried About Running Out of Food in the Last Year:     [address] in the Last Year:    Transportation Needs:     Lack of Transportation (Medical):  Lack of Transportation (Non-Medical):    Physical Activity:     Days of Exercise per Week:     Minutes of Exercise per Session:    Stress:     Feeling of Stress :    Social Connections:     Frequency of Communication with Friends and Family:     Frequency of Social Gatherings with Friends and Family:     Attends Mosque Services:     Active Member of Clubs or Organizations:     Attends Club or Organization Meetings:     Marital Status:    Intimate Partner Violence:     Fear of Current or Ex-Partner:     Emotionally Abused:     Physically Abused:     Sexually Abused:         Review of Symptoms:    Pertinent items are noted in HPI    Review of systems reviewed from Patient History Form dated on 10/28/2021 and   available in the patient's chart under the Media tab. Vital Signs:     There were no vitals filed for this visit. General Exam:     Constitutional: Patient is adequately groomed with no evidence of malnutrition    Lymphatics: no lymphadenopathy of the cervical or axillary regions or upper extremity      Physical Exam: right shoulder      Primary Exam:    Inspection: No deformity atrophy appreciable effusion      Palpation: There is tenderness over the posterior shoulder girdle diffusely      Range of Motion: Forward elevation active 115 degrees firm endpoint with pain      Special Tests: Impingement sign positive for pain negative drop arm sign      Skin: There are no rashes, ulcerations or lesions. Gait: Nonantalgic     Neurovascular - non focal and intact       Additional Comments:        Additional Examinations:               Office Imaging Results/Procedures PerformedToday:           Office Procedures:     Orders Placed This Encounter   Procedures    US GUIDED NEEDLE PLACEMENT     Standing Status:   Future     Standing Expiration Date:   11/2/2022     Order Specific Question:   Reason for exam:     Answer:   CSI    MO ARTHROCENTESIS ASPIR&/INJ MAJOR JT/BURSA W/O US   Ultrasound-guided glenohumeral joint injection-right shoulder  Logiq E ultrasound 10 MHz    Patient placed in left   lateral decubitus position. Ultrasound placed on Shoulder preset function image optimization obtained. The linear transducer was placed transversely over the posterior glenohumeral joint. After sterile preparation and use of topical anesthetic, a 25-gauge needle was advanced under direct guidance from posterior to anterior directed needle toward the humeral side of the posterior glenohumeral joint recess. Approximately 5 cc 1% lidocaine was injected. 22-gauge spinal needle was then advanced into the same needle tract and under direct guidance the needle tip was advanced just distal to the labrum within the posterior glenohumeral joint recess.   2 mL of Depo-Medrol 2 mL of Marcaine and 2 mL of lidocaine was injected and the posterior capsule was visualized distending with injectate. Image stored. Technically successful injection    Mild anesthetic response      Other Outside Imaging and Testing Personally Reviewed:    No results found. Assessment   Impression: . Encounter Diagnoses   Name Primary?  Adhesive capsulitis of right shoulder     Type 2 diabetes mellitus without complication, without long-term current use of insulin (HCC)     Right shoulder pain, unspecified chronicity       Status post ultrasound-guided intra-articular steroid injection-right glenohumeral joint      Plan: Active modification postinjection protocol and pendulum swing exercises  Clinical follow-up with Dr. Prabha Elam  Consider candidate for ultrasound-guided Hydrodilation of the glenohumeral joint. The nature of the finding, probable diagnosis and likely treatment was thoroughly discussed with the patient. The options, risks, complications, alternative treatment as well as some of the differential diagnosis was discussed. The patient was thoroughly informed and all questions were answered. the patient indicated understanding and satisfaction with the discussion. Orders:        Orders Placed This Encounter   Procedures    US GUIDED NEEDLE PLACEMENT     Standing Status:   Future     Standing Expiration Date:   11/2/2022     Order Specific Question:   Reason for exam:     Answer:   CSI    MS ARTHROCENTESIS ASPIR&/INJ MAJOR JT/BURSA W/O US           Disclaimer: \"This note was dictated with voice recognition software. Though review and correction are routine, we apologize for any errors. \"

## 2021-11-02 NOTE — LETTER
[address]  [address] [facility] [address]  Phone: [phone]  Fax: [phone]           Darylene Darner, MD      November 2, 2021     Patient: Kavon Eugene   MR Number: [id number]   YOB: 1960   Date of Visit: 11/2/2021       Dear Dr. Luis Rascon ref. provider found: Thank you for referring Kavon Eugene to me for evaluation/treatment. Below are the relevant portions of my assessment and plan of care. Impression: . Encounter Diagnoses   Name Primary?  Adhesive capsulitis of right shoulder     Type 2 diabetes mellitus without complication, without long-term current use of insulin (HCC)     Right shoulder pain, unspecified chronicity       Status post ultrasound-guided intra-articular steroid injection-right glenohumeral joint      Plan: Active modification postinjection protocol and pendulum swing exercises  Clinical follow-up with Dr. Iraida Blanchard  Consider candidate for ultrasound-guided Hydrodilation of the glenohumeral joint. The nature of the finding, probable diagnosis and likely treatment was thoroughly discussed with the patient. The options, risks, complications, alternative treatment as well as some of the differential diagnosis was discussed. The patient was thoroughly informed and all questions were answered. the patient indicated understanding and satisfaction with the discussion. Orders:      No orders of the defined types were placed in this encounter. Disclaimer: \"This note was dictated with voice recognition software. Though review and correction are routine, we apologize for any errors. \"           If you have questions, please do not hesitate to call me. I look forward to following Jade along with you.     Sincerely,        Darylene Darner, MD    CC providers:  Luis Miguel Contreras MD  [address] [place] [address]  [place] [place]

## 2021-11-06 ENCOUNTER — HOSPITAL ENCOUNTER (OUTPATIENT)
Dept: MRI IMAGING | Age: 61
Discharge: HOME | End: 2021-11-06
Payer: COMMERCIAL

## 2021-11-06 DIAGNOSIS — M25.511 RIGHT SHOULDER PAIN, UNSPECIFIED CHRONICITY: ICD-10-CM

## 2021-11-06 PROCEDURE — 73221 MRI JOINT UPR EXTREM W/O DYE: CPT

## 2022-01-20 DIAGNOSIS — E11.9 TYPE 2 DIABETES MELLITUS WITHOUT COMPLICATION, WITHOUT LONG-TERM CURRENT USE OF INSULIN (HCC): ICD-10-CM

## 2022-01-20 RX ORDER — DULAGLUTIDE 1.5 MG/.5ML
1.5 INJECTION, SOLUTION SUBCUTANEOUS
Qty: 2 PEN | Refills: 2 | Status: SHIPPED | OUTPATIENT
Start: 2022-01-20 | End: 2022-04-06

## 2022-01-20 NOTE — TELEPHONE ENCOUNTER
Medication:   Requested Prescriptions     Pending Prescriptions Disp Refills    TRULICITY 1.5 GW/1.9NO SOPN [Pharmacy Med Name: Huntington Ortiz 1.5 FJ/6.8 ML PEN]  3     Sig: INJECT 1.5 MG INTO THE SKIN EVERY 7 DAYS        Last Filled: 11/1/2021     Patient Phone Number: [phone] (home)     Last appt: 10/19/2021   Next appt: 5/3/2022    Last OARRS:   RX Monitoring 8/30/2019   Attestation -   Periodic Controlled Substance Monitoring Possible medication side effects, risk of tolerance/dependence & alternative treatments discussed. ;No signs of potential drug abuse or diversion identified. ;Assessed functional status. ;Obtaining appropriate analgesic effect of treatment.

## 2022-02-17 ENCOUNTER — OFFICE VISIT (OUTPATIENT)
Dept: FAMILY MEDICINE CLINIC | Age: 62
End: 2022-02-17
Payer: COMMERCIAL

## 2022-02-17 VITALS — HEART RATE: 114 BPM | TEMPERATURE: 99.9 F | OXYGEN SATURATION: 97 %

## 2022-02-17 DIAGNOSIS — Z20.822 SUSPECTED COVID-19 VIRUS INFECTION: Primary | ICD-10-CM

## 2022-02-17 PROCEDURE — 99213 OFFICE O/P EST LOW 20 MIN: CPT | Performed by: FAMILY MEDICINE

## 2022-02-17 NOTE — PROGRESS NOTES
2022  Anjum Reynoso (:  1960)    Allergies: No Known Allergies        FLU/RESPIRATORY/COVID-19 CLINIC EVALUATION    HPI:   Chief Complaint   Patient presents with    Congestion        SYMPTOMS:    INSTRUCTIONS:  \"[x]\" Indicates a positive item  \"[]\" Indicates a negative item        Symptom duration, days:    Date symptoms started : ____________    [] 1   [x] 2   [] 3   [] 4 - 7   [] 8 - 10   [] 11 - 13   [] >14    [x] Fevers    [] Symptom (not measured)  [] Measured (Result:  degrees)  [x] Chills  [] Cough [] Dry [] Productive   []Loss of Taste  [] Loss of Smell  []Decreased Appetite  [] Coughing up blood  }  [] Chest Congestion  [x] Nasal Congestion  [x] Runny  Nose  [] Sneezing  [x] Feeling short of breath   []Sometimes    [] Frequently    [] All the time     [] Chest pain     [x] Headaches  []Tolerable  [] Severe     [x] Fatigue  [] Sore throat  [] Muscle aches  [x] Nausea  [] Vomiting  []Unable to keep fluids down     [] Diarrhea  [] Mild  []Severe       [] Vaccinated for COVID 19  [] History of COVID 19 (Date:           )      [] OTHER SYMPTOMS: Home test positive last night      Symptom course:   [] Worsening     [] Stable     [] Improving      RISK FACTORS:1INSTRUCTIONS:  \"[x]\" Indicates a positive item. Negative  for risk factors if not checked.     [] Close contact with a lab confirmed COVID-19 patient within 14 days of symptom onset  [] History of travel from affected geographical areas within 14 days of symptom onset        PHYSICAL EXAMINATION:    Vitals:    22 1647   Pulse: 114   Temp: 99.9 °F (37.7 °C)   SpO2: 97%          [x] Alert  [x] Oriented to person/place/time    [] No apparent distress   [] Toxic appearing  [] Face flushed appearing     [x] Normal Mood  [] Anxious appearing      [] Sclera clear    [x] Pinna, TMs,  Canals normal bilaterally  [] TM Red  [] Right [] Left [] Bilateral  [] TM Bulging [] Right [] Left []  Billateral    [] Oropharynx [] Clear [] Red [] Exudate [] Swollen    [] No adenopathy [] Adenopathy __________    [x] Lungs clear with good movement and effort  [x] Breathing appears normal     [x] Speaks in complete sentences  [] Appears tachypneic   [] Wheezing           [] Rhonchi   [] Decreased    [x] CV RRR  [] No Murmur  [] Murmur  [] Irregular  [x] Tachycardic    [] OTHER:  1}      TESTS ORDERED:    [] POCT FLU  [] POCT STREP  [x] COVID-19 Test sent  [] Appointment made at testing clinic for patient to get a COVID test.       TEST RESULTS:    POCT FLU test:  [] Positive  [] Negative  POCT STREP test:  [] Positive  [] Negative    ASSESSMENT:  [] Allergic Rhinitis  [] Asthma Exacerbation  [] Bronchitis  [] COPD Exacerbation  [] Gastroenteritis  [] Influenza  [] Sinusitis  [] Strep Throat [] Sore Throat  [] Viral URI   [x] Possible COVID-19   [] Exposure to COVID -19  [] Positive for COVID  [] Screening for Viral Disease (COVID test no sx)        Jade was seen today for congestion. Diagnoses and all orders for this visit:    Suspected COVID-19 virus infection  -     COVID-19    Other orders  -     COVID-19  -     COVID-19  -     COVID-19        Patient has history of diabetes. With her symptoms only present for couple of days and her increased risk with her comorbidities feel she would be a good candidate for monoclonal antibiotic.   Will order after getting her PCR test back tomorrow      [] Low risk for complications from COVID 19  [x] Moderate risk for complications from COVID 19  [] High risk for complications from COVID 19    PLAN:    [] Discharge home with written instructions for:  [] Flu management  [] Strep throat management  [] Viral respiratory illness management  [] Sinusitis management  [] Bronchitis Management  [x] Possible COVID-19 infection with self-quarantine and management of symptoms  [] Follow-up with primary care physician or emergency department if worsens  [] Note given for work    [] Referred to emergency department for evaluation      Scribe attestation: Jaycob Stone LPN, am scribing for and in the presence of Lito Jones MD. Electronically signed by Delia Bey LPN on 2/68/02 at 5:20 PM EST

## 2022-02-18 LAB — SARS-COV-2: DETECTED

## 2022-02-21 ENCOUNTER — HOSPITAL ENCOUNTER (OUTPATIENT)
Dept: ONCOLOGY | Age: 62
Setting detail: INFUSION SERIES
Discharge: HOME | End: 2022-02-21
Payer: COMMERCIAL

## 2022-02-21 VITALS
RESPIRATION RATE: 18 BRPM | DIASTOLIC BLOOD PRESSURE: 89 MMHG | TEMPERATURE: 99 F | HEART RATE: 109 BPM | SYSTOLIC BLOOD PRESSURE: 145 MMHG

## 2022-02-21 PROCEDURE — 99211 OFF/OP EST MAY X REQ PHY/QHP: CPT

## 2022-02-21 PROCEDURE — 6360000002 HC RX W HCPCS: Performed by: FAMILY MEDICINE

## 2022-02-21 PROCEDURE — 2580000003 HC RX 258: Performed by: FAMILY MEDICINE

## 2022-02-21 PROCEDURE — M0247 HC SOTROVIMAB INFUSION: HCPCS

## 2022-02-21 RX ORDER — SODIUM CHLORIDE 9 MG/ML
100 INJECTION, SOLUTION INTRAVENOUS CONTINUOUS PRN
Status: DISCONTINUED | OUTPATIENT
Start: 2022-02-21 | End: 2022-02-22 | Stop reason: HOSPADM

## 2022-02-21 RX ORDER — DIPHENHYDRAMINE HYDROCHLORIDE 50 MG/ML
50 INJECTION INTRAMUSCULAR; INTRAVENOUS
Status: ACTIVE | OUTPATIENT
Start: 2022-02-21 | End: 2022-02-21

## 2022-02-21 RX ORDER — METHYLPREDNISOLONE SODIUM SUCCINATE 125 MG/2ML
125 INJECTION, POWDER, LYOPHILIZED, FOR SOLUTION INTRAMUSCULAR; INTRAVENOUS
Status: ACTIVE | OUTPATIENT
Start: 2022-02-21 | End: 2022-02-21

## 2022-02-21 RX ORDER — ONDANSETRON 2 MG/ML
8 INJECTION INTRAMUSCULAR; INTRAVENOUS
Status: ACTIVE | OUTPATIENT
Start: 2022-02-21 | End: 2022-02-21

## 2022-02-21 RX ORDER — ALBUTEROL SULFATE 90 UG/1
4 AEROSOL, METERED RESPIRATORY (INHALATION) PRN
Status: DISCONTINUED | OUTPATIENT
Start: 2022-02-21 | End: 2022-02-22 | Stop reason: HOSPADM

## 2022-02-21 RX ORDER — ACETAMINOPHEN 325 MG/1
650 TABLET ORAL
Status: ACTIVE | OUTPATIENT
Start: 2022-02-21 | End: 2022-02-21

## 2022-02-21 RX ADMIN — SODIUM CHLORIDE 500 MG: 9 INJECTION, SOLUTION INTRAVENOUS at 13:57

## 2022-02-21 RX ADMIN — SODIUM CHLORIDE 100 ML/HR: 9 INJECTION, SOLUTION INTRAVENOUS at 13:57

## 2022-02-21 NOTE — PROGRESS NOTES
Pt to Dept for Sotrovimab  infusion. AVS printed and reviewed. Fact sheet provided. Pt informed that Sotrovimab is an unapproved drug that is authorized for use under this Emergency use authorization. Pt verbalizes understanding. Pt columba infusion with no adverse reaction noted and monitored 1 hour post infusion.  Pt discharged home and to follow up with MD.

## 2022-04-06 DIAGNOSIS — E11.9 TYPE 2 DIABETES MELLITUS WITHOUT COMPLICATION, WITHOUT LONG-TERM CURRENT USE OF INSULIN (HCC): ICD-10-CM

## 2022-04-06 RX ORDER — DULAGLUTIDE 1.5 MG/.5ML
1.5 INJECTION, SOLUTION SUBCUTANEOUS
Qty: 2 PEN | Refills: 2 | Status: SHIPPED | OUTPATIENT
Start: 2022-04-06 | End: 2022-07-05

## 2022-04-06 NOTE — TELEPHONE ENCOUNTER
Medication:   Requested Prescriptions     Pending Prescriptions Disp Refills    TRULICITY 1.5 FR/2.3WP SOPN [Pharmacy Med Name: Ronni Ramsay 1.5 RX/9.4 ML PEN]  2     Sig: INJECT 1.5 MG INTO THE SKIN EVERY 7 DAYS      Last Filled:      Patient Phone Number: [phone] (home)     Last appt: 10/19/2021   Next appt: 5/3/2022    Last OARRS:   RX Monitoring 8/30/2019   Attestation -   Periodic Controlled Substance Monitoring Possible medication side effects, risk of tolerance/dependence & alternative treatments discussed. ;No signs of potential drug abuse or diversion identified. ;Assessed functional status. ;Obtaining appropriate analgesic effect of treatment. PDMP Monitoring:    Last PDMP [place] as Reviewed [facility]):  Review User Review Instant Review Result   Bhavna Birmingham 6/17/2020 10:33 AM Reviewed PDMP [1]     Preferred Pharmacy:   [address] [place] [address] [phone] -  [phone]  [address]   [place] [address]  Phone: [phone] Fax: [phone]

## 2022-04-19 DIAGNOSIS — G47.00 INSOMNIA, UNSPECIFIED TYPE: ICD-10-CM

## 2022-04-19 RX ORDER — TRAZODONE HYDROCHLORIDE 50 MG/1
TABLET ORAL
Qty: 90 TABLET | Refills: 1 | Status: SHIPPED | OUTPATIENT
Start: 2022-04-19 | End: 2022-08-11

## 2022-05-03 ENCOUNTER — OFFICE VISIT (OUTPATIENT)
Dept: FAMILY MEDICINE CLINIC | Age: 62
End: 2022-05-03
Payer: COMMERCIAL

## 2022-05-03 ENCOUNTER — HOSPITAL ENCOUNTER (OUTPATIENT)
Age: 62
Discharge: HOME | End: 2022-05-03
Payer: COMMERCIAL

## 2022-05-03 VITALS
BODY MASS INDEX: 29.81 KG/M2 | DIASTOLIC BLOOD PRESSURE: 78 MMHG | WEIGHT: 163 LBS | HEART RATE: 96 BPM | OXYGEN SATURATION: 98 % | TEMPERATURE: 97.1 F | SYSTOLIC BLOOD PRESSURE: 130 MMHG

## 2022-05-03 DIAGNOSIS — E11.9 TYPE 2 DIABETES MELLITUS WITHOUT COMPLICATION, WITHOUT LONG-TERM CURRENT USE OF INSULIN (HCC): ICD-10-CM

## 2022-05-03 DIAGNOSIS — Z23 NEED FOR VACCINATION: ICD-10-CM

## 2022-05-03 DIAGNOSIS — E78.49 OTHER HYPERLIPIDEMIA: ICD-10-CM

## 2022-05-03 DIAGNOSIS — Z00.00 PREVENTATIVE HEALTH CARE: Primary | ICD-10-CM

## 2022-05-03 DIAGNOSIS — Z00.00 PREVENTATIVE HEALTH CARE: ICD-10-CM

## 2022-05-03 LAB
A/G RATIO: 2 (ref 1.1–2.2)
ALBUMIN SERPL-MCNC: 4.7 G/DL (ref 3.4–5)
ALP BLD-CCNC: 143 U/L (ref 40–129)
ALT SERPL-CCNC: 16 U/L (ref 10–40)
ANION GAP SERPL CALCULATED.3IONS-SCNC: 14 MMOL/L (ref 3–16)
AST SERPL-CCNC: 17 U/L (ref 15–37)
BASOPHILS ABSOLUTE: 0.1 K/UL (ref 0–0.2)
BASOPHILS RELATIVE PERCENT: 1.3 %
BILIRUB SERPL-MCNC: 0.3 MG/DL (ref 0–1)
BUN BLDV-MCNC: 13 MG/DL (ref 7–20)
CALCIUM SERPL-MCNC: 10.2 MG/DL (ref 8.3–10.6)
CHLORIDE BLD-SCNC: 102 MMOL/L (ref 99–110)
CHOLESTEROL, FASTING: 248 MG/DL (ref 0–199)
CO2: 23 MMOL/L (ref 21–32)
CREAT SERPL-MCNC: 0.6 MG/DL (ref 0.6–1.2)
CREATININE URINE: 56.3 MG/DL (ref 28–259)
EOSINOPHILS ABSOLUTE: 0.2 K/UL (ref 0–0.6)
EOSINOPHILS RELATIVE PERCENT: 2.3 %
GFR AFRICAN AMERICAN: >60
GFR NON-AFRICAN AMERICAN: >60
GLUCOSE FASTING: 187 MG/DL (ref 70–99)
HCT VFR BLD CALC: 45.2 % (ref 36–48)
HDLC SERPL-MCNC: 50 MG/DL (ref 40–60)
HEMOGLOBIN: 15.1 G/DL (ref 12–16)
HEPATITIS C ANTIBODY INTERPRETATION: NORMAL
LDL CHOLESTEROL CALCULATED: 157 MG/DL
LYMPHOCYTES ABSOLUTE: 3.3 K/UL (ref 1–5.1)
LYMPHOCYTES RELATIVE PERCENT: 47.2 %
MCH RBC QN AUTO: 28.5 PG (ref 26–34)
MCHC RBC AUTO-ENTMCNC: 33.4 G/DL (ref 31–36)
MCV RBC AUTO: 85.3 FL (ref 80–100)
MICROALBUMIN UR-MCNC: <1.2 MG/DL
MICROALBUMIN/CREAT UR-RTO: NORMAL MG/G (ref 0–30)
MONOCYTES ABSOLUTE: 0.4 K/UL (ref 0–1.3)
MONOCYTES RELATIVE PERCENT: 6.3 %
NEUTROPHILS ABSOLUTE: 3 K/UL (ref 1.7–7.7)
NEUTROPHILS RELATIVE PERCENT: 42.9 %
PDW BLD-RTO: 13.6 % (ref 12.4–15.4)
PLATELET # BLD: 377 K/UL (ref 135–450)
PMV BLD AUTO: 8.2 FL (ref 5–10.5)
POTASSIUM SERPL-SCNC: 4.5 MMOL/L (ref 3.5–5.1)
RBC # BLD: 5.3 M/UL (ref 4–5.2)
SODIUM BLD-SCNC: 139 MMOL/L (ref 136–145)
TOTAL PROTEIN: 7.1 G/DL (ref 6.4–8.2)
TRIGLYCERIDE, FASTING: 205 MG/DL (ref 0–150)
VLDLC SERPL CALC-MCNC: 41 MG/DL
WBC # BLD: 7.1 K/UL (ref 4–11)

## 2022-05-03 PROCEDURE — 90471 IMMUNIZATION ADMIN: CPT | Performed by: NURSE PRACTITIONER

## 2022-05-03 PROCEDURE — 82570 ASSAY OF URINE CREATININE: CPT

## 2022-05-03 PROCEDURE — 85025 COMPLETE CBC W/AUTO DIFF WBC: CPT

## 2022-05-03 PROCEDURE — 82043 UR ALBUMIN QUANTITATIVE: CPT

## 2022-05-03 PROCEDURE — 86702 HIV-2 ANTIBODY: CPT

## 2022-05-03 PROCEDURE — 83036 HEMOGLOBIN GLYCOSYLATED A1C: CPT

## 2022-05-03 PROCEDURE — 86701 HIV-1ANTIBODY: CPT

## 2022-05-03 PROCEDURE — 87390 HIV-1 AG IA: CPT

## 2022-05-03 PROCEDURE — 80053 COMPREHEN METABOLIC PANEL: CPT

## 2022-05-03 PROCEDURE — 80061 LIPID PANEL: CPT

## 2022-05-03 PROCEDURE — 86803 HEPATITIS C AB TEST: CPT

## 2022-05-03 PROCEDURE — 90732 PPSV23 VACC 2 YRS+ SUBQ/IM: CPT | Performed by: NURSE PRACTITIONER

## 2022-05-03 PROCEDURE — 36415 COLL VENOUS BLD VENIPUNCTURE: CPT

## 2022-05-03 PROCEDURE — 99396 PREV VISIT EST AGE 40-64: CPT | Performed by: NURSE PRACTITIONER
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
## 2022-05-04 LAB
ESTIMATED AVERAGE GLUCOSE: 248.9 MG/DL
HBA1C MFR BLD: 10.3 %
HIV AG/AB: NORMAL
HIV ANTIGEN: NORMAL
HIV-1 ANTIBODY: NORMAL
HIV-2 AB: NORMAL

## 2022-05-05 DIAGNOSIS — E78.2 MIXED HYPERLIPIDEMIA: ICD-10-CM

## 2022-05-05 DIAGNOSIS — E11.65 UNCONTROLLED TYPE 2 DIABETES MELLITUS WITH HYPERGLYCEMIA (HCC): Primary | ICD-10-CM

## 2022-05-05 RX ORDER — INSULIN GLARGINE 100 [IU]/ML
10 INJECTION, SOLUTION SUBCUTANEOUS NIGHTLY
Qty: 5 PEN | Refills: 0 | Status: SHIPPED | OUTPATIENT
Start: 2022-05-05 | End: 2022-08-15

## 2022-05-05 RX ORDER — ROSUVASTATIN CALCIUM 20 MG/1
20 TABLET, COATED ORAL NIGHTLY
Qty: 30 TABLET | Refills: 3 | Status: SHIPPED | OUTPATIENT
Start: 2022-05-05 | End: 2022-05-31

## 2022-05-10 ENCOUNTER — TELEPHONE (OUTPATIENT)
Dept: FAMILY MEDICINE CLINIC | Age: 62
End: 2022-05-10

## 2022-05-10 NOTE — TELEPHONE ENCOUNTER
Advised to take both insulin and trulicity, will re-evaluate after hgbA1c improves to start removing medications.

## 2022-05-10 NOTE — TELEPHONE ENCOUNTER
----- Message from [facility] sent at 5/10/2022 10:07 AM EDT -----  Subject: Medication Problem    QUESTIONS  Name of Medication? TRULICITY 1.5 NR/7.5YE SOPN  Patient-reported dosage and instructions? 1.5 mg, take once a week  What question or problem do you have with the medication? Patient is   requesting call back regarding Trulicity; is she supposed to take both   prescriptions or insulin pen only. [phone] - F [phone]  Pharmacy phone number (if available)? [phone]  Additional Information for Provider?   ---------------------------------------------------------------------------  --------------  [address]  What is the best way for the office to contact you? OK to leave message on   voicemail  Preferred Call Back Phone Number? [phone]  ---------------------------------------------------------------------------  --------------  SCRIPT ANSWERS  Relationship to Patient?  Self

## 2022-05-10 NOTE — TELEPHONE ENCOUNTER
Called Pt, told her to continue both. Stated she understood and will check blood sugar before taking insulin. Had no further questions.

## 2022-05-29 DIAGNOSIS — E78.2 MIXED HYPERLIPIDEMIA: ICD-10-CM

## 2022-05-31 RX ORDER — ROSUVASTATIN CALCIUM 20 MG/1
20 TABLET, COATED ORAL NIGHTLY
Qty: 30 TABLET | Refills: 3 | Status: SHIPPED | OUTPATIENT
Start: 2022-05-31 | End: 2022-08-30

## 2022-07-03 DIAGNOSIS — E11.9 TYPE 2 DIABETES MELLITUS WITHOUT COMPLICATION, WITHOUT LONG-TERM CURRENT USE OF INSULIN (HCC): ICD-10-CM

## 2022-07-05 ENCOUNTER — OFFICE VISIT (OUTPATIENT)
Dept: FAMILY MEDICINE CLINIC | Age: 62
End: 2022-07-05
Payer: COMMERCIAL

## 2022-07-05 ENCOUNTER — NURSE TRIAGE (OUTPATIENT)
Dept: OTHER | Facility: CLINIC | Age: 62
End: 2022-07-05

## 2022-07-05 VITALS
WEIGHT: 155 LBS | DIASTOLIC BLOOD PRESSURE: 86 MMHG | SYSTOLIC BLOOD PRESSURE: 133 MMHG | HEART RATE: 93 BPM | BODY MASS INDEX: 28.35 KG/M2 | TEMPERATURE: 97.3 F

## 2022-07-05 DIAGNOSIS — L55.9 SUNBURN: Primary | ICD-10-CM

## 2022-07-05 PROCEDURE — 99213 OFFICE O/P EST LOW 20 MIN: CPT | Performed by: FAMILY MEDICINE

## 2022-07-05 RX ORDER — DULAGLUTIDE 1.5 MG/.5ML
1.5 INJECTION, SOLUTION SUBCUTANEOUS
Qty: 15 PEN | Refills: 2 | Status: SHIPPED | OUTPATIENT
Start: 2022-07-05

## 2022-07-05 RX ORDER — IBUPROFEN 600 MG/1
600 TABLET ORAL EVERY 6 HOURS PRN
Qty: 60 TABLET | Refills: 1 | Status: SHIPPED | OUTPATIENT
Start: 2022-07-05

## 2022-07-05 ASSESSMENT — PATIENT HEALTH QUESTIONNAIRE - PHQ9: DEPRESSION UNABLE TO ASSESS: URGENT/EMERGENT SITUATION

## 2022-07-05 NOTE — PATIENT INSTRUCTIONS
Patient Education        Sunburn: Care Instructions  Overview  A sunburn is skin damage from the sun's ultraviolet (UV) rays. Most sunburns cause mild pain and redness but affect only the outer layer of skin. These are called first-degree burns. The red skin might hurt when you touch it. Thesesunburns are mild and can usually be treated at home. Skin that is red and painful and that swells up and blisters may mean that deep skin layers and nerve endings have been damaged. These are second-degree burns. This type of sunburn is usually more painful and takes longer to heal.  Follow-up care is a key part of your treatment and safety. Be sure to make and go to all appointments, and call your doctor if you are having problems. It's also a good idea to know your test results and keep alist of the medicines you take. How can you care for yourself at home?  Use cool cloths on the sunburned areas.  Apply soothing lotions with aloe vera to sunburned areas.  Try anti-inflammatory medicine (like ibuprofen) to reduce pain, swelling, and fever. Read and follow all instructions on the label.  Don't try to stop peeling after a sunburn. It's part of the healing process.  Protect your skin by using sunscreen, hats, and loose-fitting, tightly-woven clothes. Caring for blisters  Blisters often heal on their own.  Don't try to break blisters. Leave them alone.  Don't remove the flap of skin covering the blister unless it tears or gets dirty or pus forms under it. The flap protects the healing skin underneath.  If a blister ruptures, gently clean it with mild soap and water and loosely cover it. Put a thin layer of petroleum jelly on the bandage before you put the bandage on. This will keep it from sticking to the blister. When should you call for help? Call your doctor now or seek immediate medical care if:     You have signs of needing more fluids.  You have sunken eyes, a dry mouth, and you pass only a little urine.      You have signs of infection, such as:  ? Increased pain, swelling, warmth, or redness. ? Red streaks leading from the area. ? Pus draining from the area. ? A fever. Watch closely for changes in your health, and be sure to contact your doctor if:     You do not get better as expected. Where can you learn more? Go to [email or web address] [email or web address].[email or web address]. org and sign in to your [employer] account. Enter O196 in the Zingaya box to learn more about \"Sunburn: Care Instructions. \"     If you do not have an account, please click on the \"Sign Up Now\" link. Current as of: March 9, 2022               Content Version: 13.3  © 2006-2022 Healthwise, Incorporated. Care instructions adapted under license by [facility] ([facility]). If you have questions about a medical condition or this instruction, always ask your healthcare professional. [address] any warranty or liability for your use of this information.
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
## 2022-07-05 NOTE — TELEPHONE ENCOUNTER
Received call from [facility] at [facility] with Red Flag Complaint. Subjective: Caller states \"I have sunburn on my legs. It is from my knees to my ankle and it is on both legs. \"     Current Symptoms: no blisters. Red and shiny in coloration. No burn on bottom of feet. Onset: 2 days ago; sudden    Associated Symptoms: first degree burn    Pain Severity: 8/10; sharp; constant. Worse when standing. Feels like it is stretching. Temperature: denies     What has been tried: aloe. LMP: NA Pregnant: NA    Recommended disposition: See in Office Today    Care advice provided, patient verbalizes understanding; denies any other questions or concerns; instructed to call back for any new or worsening symptoms. Patient/Caller agrees with recommended disposition; writer provided warm transfer to [facility] at [facility] for appointment scheduling     Attention Provider: Thank you for allowing me to participate in the care of your patient. The patient was connected to triage in response to information provided to the ECC/PSC. Please do not respond through this encounter as the response is not directed to a shared pool.             Reason for Disposition   SEVERE sunburn pain (e.g., excruciating)    Protocols used: [id number]

## 2022-08-11 DIAGNOSIS — E11.9 TYPE 2 DIABETES MELLITUS WITHOUT COMPLICATION, WITHOUT LONG-TERM CURRENT USE OF INSULIN (HCC): ICD-10-CM

## 2022-08-11 DIAGNOSIS — G47.00 INSOMNIA, UNSPECIFIED TYPE: ICD-10-CM

## 2022-08-11 DIAGNOSIS — I10 ESSENTIAL HYPERTENSION: ICD-10-CM

## 2022-08-11 RX ORDER — DULAGLUTIDE 0.75 MG/.5ML
INJECTION, SOLUTION SUBCUTANEOUS
Qty: 0.5 ML | Refills: 5 | Status: SHIPPED | OUTPATIENT
Start: 2022-08-11

## 2022-08-11 RX ORDER — TRAZODONE HYDROCHLORIDE 50 MG/1
TABLET ORAL
Qty: 90 TABLET | Refills: 1 | Status: SHIPPED | OUTPATIENT
Start: 2022-08-11

## 2022-08-11 RX ORDER — LISINOPRIL 10 MG/1
TABLET ORAL
Qty: 90 TABLET | Refills: 3 | Status: SHIPPED | OUTPATIENT
Start: 2022-08-11

## 2022-08-12 DIAGNOSIS — E11.65 UNCONTROLLED TYPE 2 DIABETES MELLITUS WITH HYPERGLYCEMIA (HCC): ICD-10-CM

## 2022-08-15 RX ORDER — INSULIN GLARGINE 100 [IU]/ML
10 INJECTION, SOLUTION SUBCUTANEOUS NIGHTLY
Qty: 15 PEN | Refills: 2 | Status: SHIPPED | OUTPATIENT
Start: 2022-08-15 | End: 2022-08-24 | Stop reason: SDUPTHER

## 2022-08-22 ENCOUNTER — TELEPHONE (OUTPATIENT)
Dept: FAMILY MEDICINE CLINIC | Age: 62
End: 2022-08-22

## 2022-08-22 DIAGNOSIS — E11.9 DIABETES MELLITUS WITHOUT COMPLICATION (HCC): ICD-10-CM

## 2022-08-22 NOTE — TELEPHONE ENCOUNTER
metFORMIN (GLUCOPHAGE) 1000 MG tablet 180 tablet 1 8/18/2020     Sig - Route:  Take 1 tablet by mouth 2 times daily (with meals) - Oral      [facility] in chart

## 2022-08-24 DIAGNOSIS — E11.65 UNCONTROLLED TYPE 2 DIABETES MELLITUS WITH HYPERGLYCEMIA (HCC): ICD-10-CM

## 2022-08-24 RX ORDER — INSULIN GLARGINE 100 [IU]/ML
10 INJECTION, SOLUTION SUBCUTANEOUS NIGHTLY
Qty: 15 PEN | Refills: 2 | Status: SHIPPED | OUTPATIENT
Start: 2022-08-24

## 2022-08-29 RX ORDER — IBUPROFEN 600 MG/1
600 TABLET ORAL EVERY 6 HOURS PRN
Qty: 60 TABLET | Refills: 1 | OUTPATIENT
Start: 2022-08-29

## 2022-08-29 NOTE — TELEPHONE ENCOUNTER
Medication:   Requested Prescriptions     Pending Prescriptions Disp Refills    ibuprofen (ADVIL;MOTRIN) 600 MG tablet [Pharmacy Med Name: IBUPROFEN 600 MG TABLET] 60 tablet 1     Sig: TAKE 1 TABLET BY MOUTH EVERY 6 HOURS AS NEEDED FOR PAIN TAKE WITH FOOD          Patient Phone Number: [phone] (home)     Last appt: 7/5/2022   Next appt: Visit date not found    Last OARRS:   RX Monitoring 8/30/2019   Attestation -   Periodic Controlled Substance Monitoring Possible medication side effects, risk of tolerance/dependence & alternative treatments discussed. ;No signs of potential drug abuse or diversion identified. ;Assessed functional status. ;Obtaining appropriate analgesic effect of treatment. PDMP Monitoring:    Last PDMP Mynor Elders as Reviewed [facility]):  Review User Review Instant Review Result   Trace Burgos 6/17/2020 10:33 AM Reviewed PDMP [1]     Preferred Pharmacy:   [address] [place] [address] [phone] - F [phone]  [address]   [place] [address]  Phone: [phone] Fax: [phone]

## 2022-08-30 DIAGNOSIS — E78.2 MIXED HYPERLIPIDEMIA: ICD-10-CM

## 2022-08-30 RX ORDER — ROSUVASTATIN CALCIUM 20 MG/1
20 TABLET, COATED ORAL NIGHTLY
Qty: 90 TABLET | Refills: 1 | Status: SHIPPED | OUTPATIENT
Start: 2022-08-30

## 2022-10-11 ENCOUNTER — HOSPITAL ENCOUNTER (OUTPATIENT)
Dept: WOMENS IMAGING | Age: 62
Discharge: HOME | End: 2022-10-11
Payer: COMMERCIAL

## 2022-10-11 ENCOUNTER — OFFICE VISIT (OUTPATIENT)
Dept: SURGERY | Age: 62
End: 2022-10-11
Payer: COMMERCIAL

## 2022-10-11 ENCOUNTER — PRE-PROCEDURE TELEPHONE (OUTPATIENT)
Dept: WOMENS IMAGING | Age: 62
End: 2022-10-11

## 2022-10-11 ENCOUNTER — HOSPITAL ENCOUNTER (OUTPATIENT)
Dept: ULTRASOUND IMAGING | Age: 62
Discharge: HOME | End: 2022-10-11
Payer: COMMERCIAL

## 2022-10-11 VITALS
BODY MASS INDEX: 25.05 KG/M2 | SYSTOLIC BLOOD PRESSURE: 124 MMHG | DIASTOLIC BLOOD PRESSURE: 72 MMHG | HEART RATE: 83 BPM | RESPIRATION RATE: 18 BRPM | HEIGHT: 67 IN | WEIGHT: 159.6 LBS | OXYGEN SATURATION: 98 %

## 2022-10-11 VITALS — WEIGHT: 160 LBS | HEIGHT: 67 IN | BODY MASS INDEX: 25.11 KG/M2

## 2022-10-11 DIAGNOSIS — Z08 ENCOUNTER FOR FOLLOW-UP SURVEILLANCE OF BREAST CANCER: ICD-10-CM

## 2022-10-11 DIAGNOSIS — N60.02 CYST OF LEFT BREAST: ICD-10-CM

## 2022-10-11 DIAGNOSIS — Z85.3 HISTORY OF BREAST CANCER: ICD-10-CM

## 2022-10-11 DIAGNOSIS — Z90.11 S/P RIGHT MASTECTOMY: ICD-10-CM

## 2022-10-11 DIAGNOSIS — R92.8 ABNORMAL MAMMOGRAM: ICD-10-CM

## 2022-10-11 DIAGNOSIS — N63.23 MASS OF LOWER OUTER QUADRANT OF LEFT BREAST: ICD-10-CM

## 2022-10-11 DIAGNOSIS — Z85.3 ENCOUNTER FOR FOLLOW-UP SURVEILLANCE OF BREAST CANCER: ICD-10-CM

## 2022-10-11 DIAGNOSIS — R92.8 ABNORMAL FINDING ON BREAST IMAGING: Primary | ICD-10-CM

## 2022-10-11 PROCEDURE — 77065 DX MAMMO INCL CAD UNI: CPT

## 2022-10-11 PROCEDURE — 99214 OFFICE O/P EST MOD 30 MIN: CPT | Performed by: NURSE PRACTITIONER

## 2022-10-11 PROCEDURE — 76642 ULTRASOUND BREAST LIMITED: CPT

## 2022-10-11 PROCEDURE — 77063 BREAST TOMOSYNTHESIS BI: CPT

## 2022-10-11 NOTE — PROGRESS NOTES
Surgical Breast Oncology       CC: One year [id number] for breast check and mammogram     HPI: Lalo Bryant is a 62 y.o. woman here for annual follow up for breast check secondary to personal history of right breast cancer. She is s/p right mastectomy with SLNB. She is on tamoxifen and tolerating it well. She has no breast related concerns today. She states that she does perform routine self breast evaluations and has not noticed any new abnormalities such as masses, skin changes, color changes,nipple discharge, or changes to the nipple-areolar complex. Increasing mass on left breast imaging today, biopsy has been recommended. INTERVAL HX:  On 7/20/2016 she underwent a right mastectomy with sentinel lymph node biopsy and immediate tissue expander based reconstruction. Pathology identified DCIS, grade 2, ER positive, PA positive. There were 0/6 lymph nodes involved with carcinoma. [id number]. Tamoxifen initiated. On 9/21/2018 she underwent left breast screening mammography. There are no concerning findings suggestive of malignancy. BI-RADS 2. On 9/23/2019 she underwent left screening mammography. There are stable findings noted. There are no new concerning findings suggestive of malignancy. BI-RADS2    On 9/29/2020 she underwent left screening mammography. There are stable findings noted. There are new concerning findings suggestive of malignancy. BI-RADS 2. On 10/5/2021 she underwent left screening mammography. Stable findings noted no new concerning findings suggestive of malignancy. BI-RADS 2. On 10/11/2022 she underwent left breast imaging and U/S.  1cm mass at 5:00, increasing in size from prior imaging, has sono correlate at 5:00, 2cm FN showing a hypoechoic well circumscribed 1x0.x8.0.x0.6 mass, biopsy is recommended. BI-RADS4.     There is also a 4mm asymmetry left breast at 6:00, 6-7cm, FN, sonographically there are 3 small clustered microcysts in the left breast at 6:00, 2cmFN as well as at 6:00, 5cmFN, which one likely correlates with mammographic findings, likely benign, 6 month left diagnostic mammogram and U/S is recommended. BI-RADS3 .         Past Medical History:   Diagnosis Date    Cancer ([address]     right breast    Carpal tunnel syndrome     Colon polyps     Dizziness     Fatty liver     Headache(784.0)     Hyperlipidemia     Hypertension     Meniere disease     Migraines     Obesity     Optic atrophy     left    Papilloma of breast     bilateral    Thoracic back pain     Tinnitus     Type II or unspecified type diabetes mellitus without mention of complication, not stated as uncontrolled     Urge incontinence     Vertigo        Past Surgical History:   Procedure Laterality Date    BREAST BIOPSY Bilateral 06/15/2016    : RIGHT NEEDLE LOCALIZATION OF TWO SITES EXCISIONAL BREAST    BREAST SURGERY Bilateral 02/15/2017    SECOND STAGE RIGHT BREAST RECONSTRUCTION WITH PLACEMENT OF SILICONE BREAST IMPLANT ; left breast reduction    CARPAL TUNNEL RELEASE Right 2019    RIGHT CARPAL TUNNEL RELEASE performed by Tyree Ojeda MD at [address] [place] [address] Right 2016     RIGHT SKIN [address] MASTECTOMY WITH Tc99 AND Yfn Beyer,       Family History   Problem Relation Age of Onset    Arthritis Mother     Asthma Mother     Diabetes Mother     Heart Disease Mother     High Blood Pressure Mother     High Cholesterol Mother     Hypertension Mother     Osteoarthritis Mother     Arthritis Father     Asthma Father     Cancer Father     High Cholesterol Father     Diabetes Father     Hypertension Father     Asthma Son     Diabetes Sister        Allergies as of 10/11/2022    (No Known Allergies)       Social History     Tobacco Use    Smoking status: Former     Packs/day: 1.00     Years: 10.00     Pack years: 10.00     Types: Cigarettes     Quit date:      Years since quittin.7    Smokeless tobacco: Never   Vaping Use    Vaping Use: Never used   Substance Use Topics    Alcohol use: Yes     Alcohol/week: 0.0 standard drinks     Comment: occasional    Drug use: No       Current Outpatient Medications on File Prior to Visit   Medication Sig Dispense Refill    metFORMIN (GLUCOPHAGE) 1000 MG tablet Take 1 tablet by mouth 2 times daily (with meals) 180 tablet 1    traZODone (DESYREL) 50 MG tablet TAKE 1 TABLET BY MOUTH EVERY DAY AT BEDTIME AS NEEDED FOR SLEEP 90 tablet 1    TRULICITY 0.20 IB/3.4XH SOPN INJECT 1 PEN ONCE WEEKLY 0.5 mL 5    rosuvastatin (CRESTOR) 20 MG tablet TAKE 1 TABLET BY MOUTH NIGHTLY (Patient not taking: Reported on 10/11/2022) 90 tablet 1    insulin glargine (BASAGLAR KWIKPEN) 100 UNIT/ML injection pen Inject 10 Units into the skin nightly (Patient not taking: Reported on 10/11/2022) 15 pen 2    lisinopril (PRINIVIL;ZESTRIL) 10 MG tablet TAKE 1 TABLET BY MOUTH EVERY DAY (Patient not taking: Reported on 10/11/2022) 90 tablet 3    TRULICITY 1.5 EC/9.6JE SOPN INJECT 1.5 MG INTO THE SKIN EVERY 7 DAYS (Patient not taking: Reported on 10/11/2022) 15 pen 2    ibuprofen (ADVIL;MOTRIN) 600 MG tablet Take 1 tablet by mouth every 6 hours as needed for Pain Take with food (Patient not taking: Reported on 10/11/2022) 60 tablet 1    oxybutynin (DITROPAN XL) 15 MG extended release tablet TAKE ONE TABLET BY MOUTH DAILY (Patient not taking: Reported on 10/11/2022) 90 tablet 5    linagliptin (TRADJENTA) 5 MG tablet TAKE ONE TABLET BY MOUTH DAILY (Patient not taking: Reported on 10/11/2022) 30 tablet 5    glimepiride (AMARYL) 4 MG tablet Take 2 tablets by mouth every morning (before breakfast) (Patient taking differently: Take 8 mg by mouth every morning (before breakfast) Pt states only taking 1 per day.) 180 tablet 3    tamoxifen (NOLVADEX) 20 MG tablet TAKE ONE TABLET BY MOUTH DAILY (Patient not taking: Reported on 10/11/2022) 30 tablet 4     No current facility-administered medications on file prior to visit.         Medications: documentation has been reviewed in the electronic medical record and patient office intake form. REVIEW OF SYSTEMS:  Constitutional: Negative for fever  HENT: Negative for sore throat  Eyes: Negative for redness   Respiratory: Negative for dyspnea, cough  Cardiovascular: Negative for chest pain  Gastrointestinal: Negative for vomiting, diarrhea   Genitourinary: Negative for hematuria   Musculoskeletal: Negative for arthralgias   Skin: Negative for rash  Neurological: Negative for syncope  Hematological: Negative for adenopathy  Psychiatric/Behavorial: Negative for anxiety         PHYSICAL EXAM:  /72   Pulse 83   Resp 18   Ht 5' 7\" (1.702 m)   Wt 159 lb 9.6 oz (72.4 kg)   SpO2 98%   BMI 25.00 kg/m²   Constitutional: She appearswell-nourished. No apparent distress. Breast: The patient was examined in the upright and supine position. Right: Right breast has been surgically removed. There is implant-based reconstruction in place. Well-healed scar. Well-healed ipsilateral axillary scar. Postsurgical changes noted. She has some redundant skin. Projection is somewhat asymmetrical.  No new masses or changes in breast contour. No skin changes of the breast or nipple areolar complex. No nipple inversion or discharge. No erythema, thickening (peau d'orange), or dimpling. Left: Evidence of symmetry procedure. no new masses or changes in breast contour. No skin changes of the breast or nipple areolar complex. No nipple inversion or discharge. No erythema, thickening (peau d'orange), or dimpling. There is no axillary lymphadenopathy palpated bilaterally. Head: Normocephalic and atraumatic:   Eyes: EOM are normal. Pupils are equal, round, and reactive to light. Neck: Neck supple. No tracheal deviation present. No obvious mass. Lymphatics: No palpable supraclavicular, cervical, or axillary lymphadenopathy  Skin: No rash noted. No erythema. Neurologic: alert and oriented.   Extremities: appear well perfused. No edema. No joint deformity      [id number] STAGE: 0 right breast cancer  ER/ND positive     ASSESSMENT:  - Abnormal LEFT breast imaging - 1cm mass at 5:00, increasing in size from prior imaging, has sono correlate at 5:00, 2cm FN showing a hypoechoic well circumscribed 1x0.x8.0.x0.6 mass  - Abnormal LEFT breast imaging - 4mm asymmetry left breast at 6:00, 6-7cm, FN, sonographically there are 3 small clustered microcysts in the left breast at 6:00, 2cmFN as well as at 6:00, 5cmFN, which one likely correlates with mammographic findings, likely benign  - Personal History of Right Breast Cancer -s/p right mastectomy with SLNB 2016, s/p right breast implant-based reconstruction, s/p left reduction symmetry procedure. - Endocrine therapy - Tamoxifen   - Encounter for follow-up surveillance of breast cancer   -History of left breast excisional biopsy - papilloma 2016         PLAN:   Left U/S guided Breast Biopsy - scheduled 10/14/2022  We discussed the possible outcomes following a breast biopsy. This includes benign and malignant findings as well as the possible need for future treatments including surgery and/or additional biopsies. We also reviewed the concept of concordance. Today's imaging was reviewed and the results were discussed with the patient, all questions answered  Continue endocrine therapy per medical oncology   Signs/symptoms of recurrence were reviewed. She verbalizes understanding that she should notify the office if she identifies any abnormalities on self evaluation. Follow up cancer surveillance discussed   Discussed the importance of breast awareness including the importance and technique of self breast exams  Education provided for Healthy Lifestyle Recommendations: healthy diet, routine exercise, weight control, decreased alcohol consumption. Plan for follow-up will be based on the upcoming biopsy results.   Also, 6 month short interval follow up with left breast U/S and left diagnostic mammogram in 4/2023. SOHAM Son-CNP  [employer]   Surgical Breast Oncology   [phone]    All of the patient's questions were answered at this time however, she was encouraged to call the office with any further inquiries. Approximately 30 minutes of time were spent in preparation, direct patient contact, counseling, care coordination, documentation and activities otherwise related to this encounter.

## 2022-10-12 DIAGNOSIS — R92.8 ABNORMAL FINDING ON BREAST IMAGING: Primary | ICD-10-CM

## 2022-10-14 ENCOUNTER — HOSPITAL ENCOUNTER (OUTPATIENT)
Dept: WOMENS IMAGING | Age: 62
Discharge: HOME | End: 2022-10-14
Payer: COMMERCIAL

## 2022-10-14 ENCOUNTER — HOSPITAL ENCOUNTER (OUTPATIENT)
Dept: ULTRASOUND IMAGING | Age: 62
Discharge: HOME | End: 2022-10-14
Payer: COMMERCIAL

## 2022-10-14 DIAGNOSIS — R92.8 ABNORMAL MAMMOGRAM: ICD-10-CM

## 2022-10-14 DIAGNOSIS — R92.8 ABNORMAL FINDING ON BREAST IMAGING: ICD-10-CM

## 2022-10-14 PROCEDURE — 2709999900 US BREAST BIOPSY W LOC DEVICE 1ST LESION LEFT

## 2022-10-14 PROCEDURE — 77065 DX MAMMO INCL CAD UNI: CPT

## 2022-10-14 PROCEDURE — 88305 TISSUE EXAM BY PATHOLOGIST: CPT

## 2022-10-14 PROCEDURE — 2500000003 HC RX 250 WO HCPCS: Performed by: NURSE PRACTITIONER

## 2022-10-14 PROCEDURE — 2580000003 HC RX 258: Performed by: NURSE PRACTITIONER

## 2022-10-14 RX ORDER — SODIUM CHLORIDE 0.9 % (FLUSH) 0.9 %
5-40 SYRINGE (ML) INJECTION 2 TIMES DAILY
Status: DISCONTINUED | OUTPATIENT
Start: 2022-10-14 | End: 2022-10-15 | Stop reason: HOSPADM

## 2022-10-14 RX ORDER — LIDOCAINE HYDROCHLORIDE 10 MG/ML
5 INJECTION, SOLUTION EPIDURAL; INFILTRATION; INTRACAUDAL; PERINEURAL ONCE
Status: COMPLETED | OUTPATIENT
Start: 2022-10-14 | End: 2022-10-14

## 2022-10-14 RX ADMIN — Medication 10 ML: at 13:01

## 2022-10-14 RX ADMIN — LIDOCAINE HYDROCHLORIDE 5 ML: 10 INJECTION, SOLUTION EPIDURAL; INFILTRATION; INTRACAUDAL; PERINEURAL at 12:58

## 2022-10-14 RX ADMIN — LIDOCAINE HYDROCHLORIDE 10 ML: 10; .005 INJECTION, SOLUTION EPIDURAL; INFILTRATION; INTRACAUDAL; PERINEURAL at 13:00

## 2022-10-14 ASSESSMENT — PAIN SCALES - GENERAL: PAINLEVEL_OUTOF10: 0

## 2022-10-14 NOTE — PROGRESS NOTES
Patient here for left breast biopsy. NN reviewed the health history, allergies and medications. Patient drove herself. Radiologist reviews procedure with patient, consent signed. Patient tolerates procedure well. Compression held. Site cleansed with chloraprep, steri strips and dry dressing applied. Ice pack in place. Reviewed discharge instructions with patient and signed copy. Patient verbalized understanding and agreed to contact Nurse Navigator with any questions. Patient A&Ox3, steady on feet and discharged home.

## 2022-10-17 ENCOUNTER — FOLLOWUP TELEPHONE ENCOUNTER (OUTPATIENT)
Dept: WOMENS IMAGING | Age: 62
End: 2022-10-17

## 2022-10-17 NOTE — TELEPHONE ENCOUNTER
Nurse Navigator reviewed results of breast biopsy which showed benign breast tissue with [address] [place] [address] Drive on the pathology report. Negative for atypia or malignancy. NN reviewed radiologist follow up recommendations as concordant and to have a 3 month f/u left breast US. Pt verbalized understanding.

## 2022-10-18 DIAGNOSIS — N63.23 MASS OF LOWER OUTER QUADRANT OF LEFT BREAST: ICD-10-CM

## 2022-10-18 DIAGNOSIS — R92.8 ABNORMAL FINDING ON BREAST IMAGING: Primary | ICD-10-CM

## 2023-03-19 DIAGNOSIS — E11.9 DIABETES MELLITUS WITHOUT COMPLICATION (HCC): ICD-10-CM

## 2023-04-04 DIAGNOSIS — G47.00 INSOMNIA, UNSPECIFIED TYPE: ICD-10-CM

## 2023-04-04 RX ORDER — TRAZODONE HYDROCHLORIDE 50 MG/1
TABLET ORAL
Qty: 90 TABLET | Refills: 1 | Status: SHIPPED | OUTPATIENT
Start: 2023-04-04

## 2023-04-19 ENCOUNTER — HOSPITAL ENCOUNTER (OUTPATIENT)
Dept: WOMENS IMAGING | Age: 63
Discharge: HOME | End: 2023-04-19
Payer: COMMERCIAL

## 2023-04-19 ENCOUNTER — HOSPITAL ENCOUNTER (OUTPATIENT)
Dept: ULTRASOUND IMAGING | Age: 63
Discharge: HOME | End: 2023-04-19
Payer: COMMERCIAL

## 2023-04-19 ENCOUNTER — OFFICE VISIT (OUTPATIENT)
Dept: SURGERY | Age: 63
End: 2023-04-19

## 2023-04-19 VITALS
HEIGHT: 62 IN | HEART RATE: 80 BPM | RESPIRATION RATE: 18 BRPM | BODY MASS INDEX: 30 KG/M2 | OXYGEN SATURATION: 100 % | WEIGHT: 163 LBS

## 2023-04-19 VITALS — BODY MASS INDEX: 25.06 KG/M2 | WEIGHT: 160 LBS

## 2023-04-19 DIAGNOSIS — Z12.31 ENCOUNTER FOR SCREENING MAMMOGRAM FOR BREAST CANCER: ICD-10-CM

## 2023-04-19 DIAGNOSIS — R92.8 ABNORMAL FINDING ON BREAST IMAGING: ICD-10-CM

## 2023-04-19 DIAGNOSIS — Z90.11 S/P RIGHT MASTECTOMY: ICD-10-CM

## 2023-04-19 DIAGNOSIS — Z08 ENCOUNTER FOR FOLLOW-UP SURVEILLANCE OF BREAST CANCER: ICD-10-CM

## 2023-04-19 DIAGNOSIS — Z85.3 ENCOUNTER FOR FOLLOW-UP SURVEILLANCE OF BREAST CANCER: ICD-10-CM

## 2023-04-19 DIAGNOSIS — N60.02 CYST OF LEFT BREAST: ICD-10-CM

## 2023-04-19 DIAGNOSIS — Z12.39 ENCOUNTER FOR SCREENING BREAST EXAMINATION: Primary | ICD-10-CM

## 2023-04-19 DIAGNOSIS — Z85.3 HISTORY OF BREAST CANCER: ICD-10-CM

## 2023-04-19 PROCEDURE — 76642 ULTRASOUND BREAST LIMITED: CPT

## 2023-04-19 PROCEDURE — G0279 TOMOSYNTHESIS, MAMMO: HCPCS

## 2023-04-19 NOTE — PROGRESS NOTES
Surgical Breast Oncology       CC: 6m [id number] for breast check and mammogram     HPI: Uri Brsyon is a 65 y.o. woman here for 6m follow up for breast check secondary to personal history of right breast cancer. She is s/p right mastectomy with SLNB. She is on tamoxifen and tolerating it well. She has no breast related concerns today. She states that she does perform routine self breast evaluations and has not noticed any new abnormalities such as masses, skin changes, color changes,nipple discharge, or changes to the nipple-areolar complex. INTERVAL HX:  On 7/20/2016 she underwent a right mastectomy with sentinel lymph node biopsy and immediate tissue expander based reconstruction. Pathology identified DCIS, grade 2, ER positive, NJ positive. There were 0/6 lymph nodes involved with carcinoma. [id number]. Tamoxifen initiated. On 9/21/2018 she underwent left breast screening mammography. There are no concerning findings suggestive of malignancy. BI-RADS 2. On 9/23/2019 she underwent left screening mammography. There are stable findings noted. There are no new concerning findings suggestive of malignancy. BI-RADS2    On 9/29/2020 she underwent left screening mammography. There are stable findings noted. There are new concerning findings suggestive of malignancy. BI-RADS 2. On 10/5/2021 she underwent left screening mammography. Stable findings noted no new concerning findings suggestive of malignancy. BI-RADS 2. On 10/11/2022 she underwent left breast imaging and U/S.  1cm mass at 5:00, increasing in size from prior imaging, has sono correlate at 5:00, 2cm FN showing a hypoechoic well circumscribed 1x0.x8.0.x0.6 mass, biopsy is recommended. BI-RADS4.     There is also a 4mm asymmetry left breast at 6:00, 6-7cm, FN, sonographically there are 3 small clustered microcysts in the left breast at 6:00, 2cmFN as well as at 6:00, 5cmFN, which one likely correlates with

## 2023-04-19 NOTE — PATIENT INSTRUCTIONS
three levels of pressure to feel of all your breast tissue. Use light pressure to feel the tissue close to the skin surface. Use medium pressure to feel a little deeper. Use firm pressure to feel your tissue close to your breastbone and ribs. Use each pressure level to feel your breast tissue before moving on to the next spot. Check your entire breast, moving up and down as if following a strip from the collarbone to the bra line, and from the armpit to the ribs. Repeat until you have covered the entire breast.  Repeat this procedure for your left breast, using the pads of the 3 middle fingers of your right hand. To examine your breasts while in the shower:  Place one arm over your head and lightly soap your breast on that side. Using the pads of your fingers, gently move your hand over your breast (in the strip pattern described above), feeling carefully for any lumps or changes. Repeat for the other breast.  Have your doctor inspect anything you notice to see if you need further testing. Where can you learn more? Go to http://www.woods.com/ and enter P148 to learn more about \"Breast Self-Exam: Care Instructions. \"  Current as of: August 2, 2022               Content Version: 13.6  © 2006-2023 [employer]. Care instructions adapted under license by [facility] ([facility]). If you have questions about a medical condition or this instruction, always ask your healthcare professional. [place] [address] any warranty or liability for your use of this information.

## 2023-04-20 DIAGNOSIS — Z85.3 ENCOUNTER FOR FOLLOW-UP SURVEILLANCE OF BREAST CANCER: ICD-10-CM

## 2023-04-20 DIAGNOSIS — Z08 ENCOUNTER FOR FOLLOW-UP SURVEILLANCE OF BREAST CANCER: ICD-10-CM

## 2023-04-20 DIAGNOSIS — Z85.3 HISTORY OF BREAST CANCER: Primary | ICD-10-CM

## 2023-09-02 DIAGNOSIS — E11.9 TYPE 2 DIABETES MELLITUS WITHOUT COMPLICATION, WITHOUT LONG-TERM CURRENT USE OF INSULIN (HCC): ICD-10-CM

## 2023-09-05 RX ORDER — DULAGLUTIDE 1.5 MG/.5ML
1.5 INJECTION, SOLUTION SUBCUTANEOUS
Refills: 7 | OUTPATIENT
Start: 2023-09-05

## 2023-09-30 DIAGNOSIS — G47.00 INSOMNIA, UNSPECIFIED TYPE: ICD-10-CM

## 2023-10-02 RX ORDER — TRAZODONE HYDROCHLORIDE 50 MG/1
TABLET ORAL
Qty: 30 TABLET | Refills: 0 | Status: SHIPPED | OUTPATIENT
Start: 2023-10-02

## 2023-10-03 ENCOUNTER — OFFICE VISIT (OUTPATIENT)
Dept: FAMILY MEDICINE CLINIC | Age: 63
End: 2023-10-03
Payer: COMMERCIAL

## 2023-10-03 VITALS
WEIGHT: 158 LBS | TEMPERATURE: 98.1 F | OXYGEN SATURATION: 98 % | SYSTOLIC BLOOD PRESSURE: 136 MMHG | DIASTOLIC BLOOD PRESSURE: 80 MMHG | HEART RATE: 93 BPM | BODY MASS INDEX: 28.9 KG/M2

## 2023-10-03 DIAGNOSIS — E11.65 UNCONTROLLED TYPE 2 DIABETES MELLITUS WITH HYPERGLYCEMIA (HCC): ICD-10-CM

## 2023-10-03 DIAGNOSIS — E55.9 VITAMIN D INSUFFICIENCY: ICD-10-CM

## 2023-10-03 DIAGNOSIS — R39.15 URGENCY OF URINATION: ICD-10-CM

## 2023-10-03 DIAGNOSIS — I10 ESSENTIAL HYPERTENSION: ICD-10-CM

## 2023-10-03 DIAGNOSIS — G89.29 CHRONIC MIDLINE LOW BACK PAIN WITHOUT SCIATICA: ICD-10-CM

## 2023-10-03 DIAGNOSIS — Z12.11 SCREEN FOR COLON CANCER: ICD-10-CM

## 2023-10-03 DIAGNOSIS — E78.2 MIXED HYPERLIPIDEMIA: ICD-10-CM

## 2023-10-03 DIAGNOSIS — Z23 NEED FOR VACCINATION: ICD-10-CM

## 2023-10-03 DIAGNOSIS — M54.50 CHRONIC MIDLINE LOW BACK PAIN WITHOUT SCIATICA: ICD-10-CM

## 2023-10-03 DIAGNOSIS — Z00.00 PREVENTATIVE HEALTH CARE: Primary | ICD-10-CM

## 2023-10-03 LAB
BILIRUBIN, POC: 0
BLOOD URINE, POC: 0
CLARITY, POC: CLEAR
COLOR, POC: YELLOW
GLUCOSE URINE, POC: NORMAL
KETONES, POC: 0
LEUKOCYTE EST, POC: 0
NITRITE, POC: 0
PH, POC: 6
PROTEIN, POC: 0
SPECIFIC GRAVITY, POC: 1.02
UROBILINOGEN, POC: 0.2

## 2023-10-03 PROCEDURE — 90471 IMMUNIZATION ADMIN: CPT | Performed by: NURSE PRACTITIONER

## 2023-10-03 PROCEDURE — 99396 PREV VISIT EST AGE 40-64: CPT | Performed by: NURSE PRACTITIONER

## 2023-10-03 PROCEDURE — 90674 CCIIV4 VAC NO PRSV 0.5 ML IM: CPT | Performed by: NURSE PRACTITIONER

## 2023-10-03 PROCEDURE — 81002 URINALYSIS NONAUTO W/O SCOPE: CPT | Performed by: NURSE PRACTITIONER

## 2023-10-03 PROCEDURE — 3079F DIAST BP 80-89 MM HG: CPT | Performed by: NURSE PRACTITIONER

## 2023-10-03 PROCEDURE — 3075F SYST BP GE 130 - 139MM HG: CPT | Performed by: NURSE PRACTITIONER

## 2023-10-03 RX ORDER — LANCETS 30 GAUGE
1 EACH MISCELLANEOUS DAILY
Qty: 100 EACH | Refills: 5 | Status: SHIPPED | OUTPATIENT
Start: 2023-10-03

## 2023-10-03 RX ORDER — GLUCOSAMINE HCL/CHONDROITIN SU 500-400 MG
CAPSULE ORAL
Qty: 300 STRIP | Refills: 1 | Status: SHIPPED | OUTPATIENT
Start: 2023-10-03

## 2023-10-03 RX ORDER — BLOOD-GLUCOSE METER
1 KIT MISCELLANEOUS DAILY
Qty: 1 KIT | Refills: 0 | Status: SHIPPED | OUTPATIENT
Start: 2023-10-03

## 2023-10-03 SDOH — ECONOMIC STABILITY: FOOD INSECURITY: WITHIN THE PAST 12 MONTHS, YOU WORRIED THAT YOUR FOOD WOULD RUN OUT BEFORE YOU GOT MONEY TO BUY MORE.: NEVER TRUE

## 2023-10-03 SDOH — ECONOMIC STABILITY: FOOD INSECURITY: WITHIN THE PAST 12 MONTHS, THE FOOD YOU BOUGHT JUST DIDN'T LAST AND YOU DIDN'T HAVE MONEY TO GET MORE.: NEVER TRUE

## 2023-10-03 SDOH — ECONOMIC STABILITY: HOUSING INSECURITY
IN THE LAST 12 MONTHS, WAS THERE A TIME WHEN YOU DID NOT HAVE A STEADY PLACE TO SLEEP OR SLEPT IN A SHELTER (INCLUDING NOW)?: NO

## 2023-10-03 SDOH — ECONOMIC STABILITY: INCOME INSECURITY: HOW HARD IS IT FOR YOU TO PAY FOR THE VERY BASICS LIKE FOOD, HOUSING, MEDICAL CARE, AND HEATING?: NOT HARD AT ALL

## 2023-10-03 ASSESSMENT — PATIENT HEALTH QUESTIONNAIRE - PHQ9
SUM OF ALL RESPONSES TO PHQ QUESTIONS 1-9: 0
2. FEELING DOWN, DEPRESSED OR HOPELESS: 0
1. LITTLE INTEREST OR PLEASURE IN DOING THINGS: 0
SUM OF ALL RESPONSES TO PHQ QUESTIONS 1-9: 0
SUM OF ALL RESPONSES TO PHQ QUESTIONS 1-9: 0
SUM OF ALL RESPONSES TO PHQ9 QUESTIONS 1 & 2: 0
SUM OF ALL RESPONSES TO PHQ QUESTIONS 1-9: 0

## 2023-10-03 NOTE — PROGRESS NOTES
Ambika Rodriguez  : 1960  Encounter date: 10/3/2023    This yosvany 65 y.o. female who presents with  Chief Complaint   Patient presents with    Annual Exam     Back pain & urinary urgency       History of present illness:    HPI History and Physical      Ambika Rodriguez  YOB: 1960    Date of Service:  10/3/2023    Chief Complaint:   Ambika Rodriguez is a 65 y.o. female who presents for complete physical examination    HPI: Pt is 65year old female for annual exam.  Due for labs, very non-compliant with medications, reports has only taking trulicity, has not taken any other medications > 6 months. Discussed with patient restarting medications following lab results. Pt denies checking blood sugars at home, does not have testing supplies. Pt reports concerns for urinary urgency, UA in office positive for glycosuria, discussed correlation with uncontrolled DM. Pt also concerned with chronic mid LBP without radiculopathy. Pt rides [employer]. Works FT at [employer]. Wt Readings from Last 3 Encounters:   10/03/23 158 lb (71.7 kg)   23 163 lb (73.9 kg)   23 160 lb (72.6 kg)     BP Readings from Last 3 Encounters:   10/03/23 136/80   10/11/22 124/72   22 133/86       Patient Active Problem List   Diagnosis    Bilateral high frequency sensorineural hearing loss    Tinnitus, subjective    Vertigo    Meniere's disease    Variants of migraine, not elsewhere classified, with intractable migraine, so stated    Fatty liver disease, nonalcoholic    Nausea    Obesity (BMI 30-39. 9)    Colon polyps    Elevated cholesterol    Type 2 diabetes mellitus (HCC)    Midline thoracic back pain    Chronic bilateral low back pain without sciatica    Ductal carcinoma in situ of right breast    Use of tamoxifen (Nolvadex)    Urge incontinence    Adjustment insomnia    Essential hypertension    Left optic atrophy    Bilateral carpal tunnel syndrome    Type 2 diabetes mellitus with hyperglycemia

## 2023-10-11 ENCOUNTER — HOSPITAL ENCOUNTER (OUTPATIENT)
Dept: GENERAL RADIOLOGY | Age: 63
Discharge: HOME | End: 2023-10-11
Payer: COMMERCIAL

## 2023-10-11 ENCOUNTER — HOSPITAL ENCOUNTER (OUTPATIENT)
Age: 63
Discharge: HOME | End: 2023-10-11
Payer: COMMERCIAL

## 2023-10-11 DIAGNOSIS — M54.50 CHRONIC MIDLINE LOW BACK PAIN WITHOUT SCIATICA: ICD-10-CM

## 2023-10-11 DIAGNOSIS — E11.65 UNCONTROLLED TYPE 2 DIABETES MELLITUS WITH HYPERGLYCEMIA (HCC): ICD-10-CM

## 2023-10-11 DIAGNOSIS — E55.9 VITAMIN D INSUFFICIENCY: ICD-10-CM

## 2023-10-11 DIAGNOSIS — I10 ESSENTIAL HYPERTENSION: ICD-10-CM

## 2023-10-11 DIAGNOSIS — E78.2 MIXED HYPERLIPIDEMIA: ICD-10-CM

## 2023-10-11 DIAGNOSIS — G89.29 CHRONIC MIDLINE LOW BACK PAIN WITHOUT SCIATICA: ICD-10-CM

## 2023-10-11 LAB
25(OH)D3 SERPL-MCNC: 36.5 NG/ML
ALBUMIN SERPL-MCNC: 4.3 G/DL (ref 3.4–5)
ALBUMIN/GLOB SERPL: 1.4 {RATIO} (ref 1.1–2.2)
ALP SERPL-CCNC: 129 U/L (ref 40–129)
ALT SERPL-CCNC: 16 U/L (ref 10–40)
ANION GAP SERPL CALCULATED.3IONS-SCNC: 11 MMOL/L (ref 3–16)
AST SERPL-CCNC: 16 U/L (ref 15–37)
BASOPHILS # BLD: 0.1 K/UL (ref 0–0.2)
BASOPHILS NFR BLD: 0.8 %
BILIRUB SERPL-MCNC: 0.3 MG/DL (ref 0–1)
BUN SERPL-MCNC: 15 MG/DL (ref 7–20)
CALCIUM SERPL-MCNC: 9.4 MG/DL (ref 8.3–10.6)
CHLORIDE SERPL-SCNC: 100 MMOL/L (ref 99–110)
CHOLEST SERPL-MCNC: 232 MG/DL (ref 0–199)
CO2 SERPL-SCNC: 28 MMOL/L (ref 21–32)
CREAT SERPL-MCNC: 0.6 MG/DL (ref 0.6–1.2)
CREAT UR-MCNC: 113.9 MG/DL (ref 28–259)
DEPRECATED RDW RBC AUTO: 13.3 % (ref 12.4–15.4)
EOSINOPHIL # BLD: 0.4 K/UL (ref 0–0.6)
EOSINOPHIL NFR BLD: 4.1 %
GFR SERPLBLD CREATININE-BSD FMLA CKD-EPI: >60 ML/MIN/{1.73_M2}
GLUCOSE P FAST SERPL-MCNC: 192 MG/DL (ref 70–99)
HCT VFR BLD AUTO: 43.9 % (ref 36–48)
HDLC SERPL-MCNC: 47 MG/DL (ref 40–60)
HGB BLD-MCNC: 15.1 G/DL (ref 12–16)
LDL CHOLESTEROL CALCULATED: 133 MG/DL
LYMPHOCYTES # BLD: 3.8 K/UL (ref 1–5.1)
LYMPHOCYTES NFR BLD: 42.3 %
MCH RBC QN AUTO: 29.3 PG (ref 26–34)
MCHC RBC AUTO-ENTMCNC: 34.4 G/DL (ref 31–36)
MCV RBC AUTO: 85.4 FL (ref 80–100)
MICROALBUMIN UR DL<=1MG/L-MCNC: <1.2 MG/DL
MICROALBUMIN/CREAT UR: NORMAL MG/G (ref 0–30)
MONOCYTES # BLD: 0.6 K/UL (ref 0–1.3)
MONOCYTES NFR BLD: 6.4 %
NEUTROPHILS # BLD: 4.2 K/UL (ref 1.7–7.7)
NEUTROPHILS NFR BLD: 46.4 %
PLATELET # BLD AUTO: 314 K/UL (ref 135–450)
PMV BLD AUTO: 8.7 FL (ref 5–10.5)
POTASSIUM SERPL-SCNC: 4 MMOL/L (ref 3.5–5.1)
PROT SERPL-MCNC: 7.3 G/DL (ref 6.4–8.2)
RBC # BLD AUTO: 5.15 M/UL (ref 4–5.2)
SODIUM SERPL-SCNC: 139 MMOL/L (ref 136–145)
TRIGL SERPL-MCNC: 260 MG/DL (ref 0–150)
VLDLC SERPL CALC-MCNC: 52 MG/DL
WBC # BLD AUTO: 9 K/UL (ref 4–11)

## 2023-10-11 PROCEDURE — 82570 ASSAY OF URINE CREATININE: CPT

## 2023-10-11 PROCEDURE — 80053 COMPREHEN METABOLIC PANEL: CPT

## 2023-10-11 PROCEDURE — 82306 VITAMIN D 25 HYDROXY: CPT

## 2023-10-11 PROCEDURE — 85025 COMPLETE CBC W/AUTO DIFF WBC: CPT

## 2023-10-11 PROCEDURE — 72110 X-RAY EXAM L-2 SPINE 4/>VWS: CPT

## 2023-10-11 PROCEDURE — 36415 COLL VENOUS BLD VENIPUNCTURE: CPT

## 2023-10-11 PROCEDURE — 83036 HEMOGLOBIN GLYCOSYLATED A1C: CPT

## 2023-10-11 PROCEDURE — 80061 LIPID PANEL: CPT

## 2023-10-11 PROCEDURE — 82043 UR ALBUMIN QUANTITATIVE: CPT

## 2023-10-12 LAB
EST. AVERAGE GLUCOSE BLD GHB EST-MCNC: 286.2 MG/DL
HBA1C MFR BLD: 11.6 %

## 2023-10-15 DIAGNOSIS — I10 ESSENTIAL HYPERTENSION: ICD-10-CM

## 2023-10-15 DIAGNOSIS — E78.2 MIXED HYPERLIPIDEMIA: ICD-10-CM

## 2023-10-15 DIAGNOSIS — E11.9 TYPE 2 DIABETES MELLITUS WITHOUT COMPLICATION, WITHOUT LONG-TERM CURRENT USE OF INSULIN (HCC): ICD-10-CM

## 2023-10-15 RX ORDER — ROSUVASTATIN CALCIUM 20 MG/1
20 TABLET, COATED ORAL NIGHTLY
Qty: 90 TABLET | Refills: 1 | Status: SHIPPED | OUTPATIENT
Start: 2023-10-15

## 2023-10-15 RX ORDER — GLIMEPIRIDE 4 MG/1
8 TABLET ORAL
Qty: 180 TABLET | Refills: 3 | Status: SHIPPED | OUTPATIENT
Start: 2023-10-15 | End: 2024-01-13

## 2023-10-15 RX ORDER — DULAGLUTIDE 1.5 MG/.5ML
1.5 INJECTION, SOLUTION SUBCUTANEOUS
Qty: 15 ADJUSTABLE DOSE PRE-FILLED PEN SYRINGE | Refills: 2 | Status: SHIPPED | OUTPATIENT
Start: 2023-10-15

## 2023-10-25 DIAGNOSIS — G47.00 INSOMNIA, UNSPECIFIED TYPE: ICD-10-CM

## 2023-10-25 NOTE — TELEPHONE ENCOUNTER
Medication:   Requested Prescriptions      No prescriptions requested or ordered in this encounter      Last Filled:  4/4/2023    Patient Phone Number: [phone] (home)     Last appt: 10/3/2023   Next appt: 1/3/2024    Last OARRS:       8/30/2019    11:48 AM   RX Monitoring   Periodic Controlled Substance Monitoring Possible medication side effects, risk of tolerance/dependence & alternative treatments discussed. ;No signs of potential drug abuse or diversion identified. ;Assessed functional status. ;Obtaining appropriate analgesic effect of treatment.      PDMP Monitoring:    Last PDMP Mayra Brand as Reviewed [facility]):  Review User Review Instant Review Result   Krishan Felix 6/17/2020 10:33 AM Reviewed PDMP [1]     Preferred Pharmacy:   [facility] [place] [address]  Phone: [phone] Fax: [phone]

## 2023-10-26 RX ORDER — TRAZODONE HYDROCHLORIDE 50 MG/1
TABLET ORAL
Qty: 90 TABLET | Refills: 1 | OUTPATIENT
Start: 2023-10-26

## 2023-10-29 DIAGNOSIS — G47.00 INSOMNIA, UNSPECIFIED TYPE: ICD-10-CM

## 2023-10-30 RX ORDER — TRAZODONE HYDROCHLORIDE 50 MG/1
TABLET ORAL
Qty: 30 TABLET | Refills: 0 | OUTPATIENT
Start: 2023-10-30

## 2023-10-30 NOTE — TELEPHONE ENCOUNTER
Medication:   Requested Prescriptions     Pending Prescriptions Disp Refills    traZODone (DESYREL) 50 MG tablet [Pharmacy Med Name: TRAZODONE 50 MG TABLET] 30 tablet 0     Sig: TAKE 1 TABLET BY MOUTH AT BEDTIME AS NEEDED FOR SLEEP      Last Filled:  10/2/2023    Patient Phone Number: [phone] (home)     Last appt: 10/3/2023   Next appt: 1/3/2024    Last OARRS:       8/30/2019    11:48 AM   RX Monitoring   Periodic Controlled Substance Monitoring Possible medication side effects, risk of tolerance/dependence & alternative treatments discussed. ;No signs of potential drug abuse or diversion identified. ;Assessed functional status. ;Obtaining appropriate analgesic effect of treatment.      PDMP Monitoring:    Last PDMP [facility] as Reviewed [facility]):  Review User Review Instant Review Result   Brandon Cuevas 6/17/2020 10:33 AM Reviewed PDMP [1]     Preferred Pharmacy:   [facility] [address] [place] [address]  Phone: [phone] Fax: [phone]

## 2024-01-03 ENCOUNTER — OFFICE VISIT (OUTPATIENT)
Dept: FAMILY MEDICINE CLINIC | Age: 64
End: 2024-01-03
Payer: COMMERCIAL

## 2024-01-03 VITALS
HEART RATE: 89 BPM | DIASTOLIC BLOOD PRESSURE: 80 MMHG | SYSTOLIC BLOOD PRESSURE: 132 MMHG | OXYGEN SATURATION: 98 % | WEIGHT: 163 LBS | BODY MASS INDEX: 29.81 KG/M2 | TEMPERATURE: 97.6 F

## 2024-01-03 DIAGNOSIS — E11.65 TYPE 2 DIABETES MELLITUS WITH HYPERGLYCEMIA, WITHOUT LONG-TERM CURRENT USE OF INSULIN (HCC): Primary | ICD-10-CM

## 2024-01-03 LAB — HBA1C MFR BLD: 10.2 %

## 2024-01-03 PROCEDURE — 99213 OFFICE O/P EST LOW 20 MIN: CPT | Performed by: NURSE PRACTITIONER

## 2024-01-03 PROCEDURE — 3075F SYST BP GE 130 - 139MM HG: CPT | Performed by: NURSE PRACTITIONER

## 2024-01-03 PROCEDURE — 3079F DIAST BP 80-89 MM HG: CPT | Performed by: NURSE PRACTITIONER

## 2024-01-03 PROCEDURE — 83036 HEMOGLOBIN GLYCOSYLATED A1C: CPT | Performed by: NURSE PRACTITIONER

## 2024-01-03 PROCEDURE — 3046F HEMOGLOBIN A1C LEVEL >9.0%: CPT | Performed by: NURSE PRACTITIONER

## 2024-01-03 ASSESSMENT — PATIENT HEALTH QUESTIONNAIRE - PHQ9
1. LITTLE INTEREST OR PLEASURE IN DOING THINGS: 0
SUM OF ALL RESPONSES TO PHQ QUESTIONS 1-9: 0
SUM OF ALL RESPONSES TO PHQ9 QUESTIONS 1 & 2: 0
2. FEELING DOWN, DEPRESSED OR HOPELESS: 0
SUM OF ALL RESPONSES TO PHQ QUESTIONS 1-9: 0

## 2024-01-03 NOTE — PROGRESS NOTES
Jade Martines  : 1960  Encounter date: 1/3/2024    This yosvany 63 y.o. female who presents with  Chief Complaint   Patient presents with    Diabetes     Tingling in feet and legs       History of present illness:    HPI PT is 63 year old female for uncontrolled diabetes, previous A1c- 11.6, in office A1c- 10.2, pt denies checking daily blood sugars.  Pt reports poor dietary management, compliant with current medication.  Pt is resistant to insulin, discussed risks with uncontrolled diabetes, pt is developing bilateral neuropathy, reports twin sister is insulin dependent and uncontrolled.  Reviewed with patient typical daily diet, drinks 2 diet Mt Dews daily, eats fast food daily.  Avoids sweets but high starch diet.  Advised patient better control, if next OV, A1c > 9.0 will be initiating long acting insulin.   Advised daily monitoring, discussed dietary referral.    Current Outpatient Medications on File Prior to Visit   Medication Sig Dispense Refill    rosuvastatin (CRESTOR) 20 MG tablet Take 1 tablet by mouth nightly 90 tablet 1    linagliptin (TRADJENTA) 5 MG tablet TAKE ONE TABLET BY MOUTH DAILY 30 tablet 5    glimepiride (AMARYL) 4 MG tablet Take 2 tablets by mouth every morning (before breakfast) 180 tablet 3    glucose monitoring kit 1 kit by Does not apply route daily 1 kit 0    Lancets MISC 1 each by Does not apply route daily 100 each 5    blood glucose monitor strips Test strips of choice to check blood sugar daily #90 day  E11.65 300 strip 1    lisinopril (PRINIVIL;ZESTRIL) 10 MG tablet TAKE 1 TABLET BY MOUTH EVERY DAY (Patient not taking: Reported on 10/11/2022) 90 tablet 3    tamoxifen (NOLVADEX) 20 MG tablet TAKE ONE TABLET BY MOUTH DAILY (Patient not taking: Reported on 10/11/2022) 30 tablet 4     No current facility-administered medications on file prior to visit.      No Known Allergies  Past Medical History:   Diagnosis Date    Breast cancer (HCC)     right    Carpal tunnel syndrome

## 2024-04-03 ENCOUNTER — OFFICE VISIT (OUTPATIENT)
Dept: FAMILY MEDICINE CLINIC | Age: 64
End: 2024-04-03
Payer: COMMERCIAL

## 2024-04-03 VITALS
WEIGHT: 161 LBS | SYSTOLIC BLOOD PRESSURE: 124 MMHG | OXYGEN SATURATION: 97 % | BODY MASS INDEX: 29.45 KG/M2 | DIASTOLIC BLOOD PRESSURE: 76 MMHG | HEART RATE: 86 BPM | TEMPERATURE: 97.6 F

## 2024-04-03 DIAGNOSIS — E11.65 UNCONTROLLED TYPE 2 DIABETES MELLITUS WITH HYPERGLYCEMIA (HCC): Primary | ICD-10-CM

## 2024-04-03 LAB — HBA1C MFR BLD: 11.8 %

## 2024-04-03 PROCEDURE — 3046F HEMOGLOBIN A1C LEVEL >9.0%: CPT | Performed by: NURSE PRACTITIONER

## 2024-04-03 PROCEDURE — 83036 HEMOGLOBIN GLYCOSYLATED A1C: CPT | Performed by: NURSE PRACTITIONER

## 2024-04-03 PROCEDURE — 3078F DIAST BP <80 MM HG: CPT | Performed by: NURSE PRACTITIONER

## 2024-04-03 PROCEDURE — 3074F SYST BP LT 130 MM HG: CPT | Performed by: NURSE PRACTITIONER

## 2024-04-03 PROCEDURE — 99213 OFFICE O/P EST LOW 20 MIN: CPT | Performed by: NURSE PRACTITIONER

## 2024-04-03 RX ORDER — BLOOD-GLUCOSE,RECEIVER,CONT
1 EACH MISCELLANEOUS 2 TIMES DAILY
Qty: 2 EACH | Refills: 2 | Status: SHIPPED | OUTPATIENT
Start: 2024-04-03

## 2024-04-03 RX ORDER — BLOOD-GLUCOSE SENSOR
1 EACH MISCELLANEOUS 2 TIMES DAILY
Qty: 2 EACH | Refills: 2 | Status: SHIPPED | OUTPATIENT
Start: 2024-04-03

## 2024-04-03 RX ORDER — INSULIN GLARGINE 100 [IU]/ML
10 INJECTION, SOLUTION SUBCUTANEOUS NIGHTLY
Qty: 5 ADJUSTABLE DOSE PRE-FILLED PEN SYRINGE | Refills: 1 | Status: SHIPPED | OUTPATIENT
Start: 2024-04-03

## 2024-04-03 NOTE — PROGRESS NOTES
Jade Martines  : 1960  Encounter date: 4/3/2024    This yosvany 63 y.o. female who presents with  Chief Complaint   Patient presents with    Diabetes     3mo f/u w/a1c       History of present illness:    HPI Pt is 63 year old female for diabetes follow up, previously uncontrolled with development polyneuropathy.  Pt is prescribed trulicty 3 mg, trandjenta 5 mg and amaryl 4 mg BID.    Last A1c's-   1/3/24 10.2  10/11/23 11.6  5/3/22 10.3  10/28/21 9.3  21 7.8    Discussed risks and symptoms of uncontrolled diabetes, advised monitoring, will start long acting.  Pt reports does not check home blood sugars and very non-compliant with low CHO diet.  Reports taking current medications as prescribed.    Current Outpatient Medications on File Prior to Visit   Medication Sig Dispense Refill    Dulaglutide 3 MG/0.5ML SOPN Inject 3 mg into the skin once a week 4 Adjustable Dose Pre-filled Pen Syringe 2    rosuvastatin (CRESTOR) 20 MG tablet Take 1 tablet by mouth nightly 90 tablet 1    linagliptin (TRADJENTA) 5 MG tablet TAKE ONE TABLET BY MOUTH DAILY 30 tablet 5    glimepiride (AMARYL) 4 MG tablet Take 2 tablets by mouth every morning (before breakfast) 180 tablet 3    glucose monitoring kit 1 kit by Does not apply route daily 1 kit 0    Lancets MISC 1 each by Does not apply route daily 100 each 5    blood glucose monitor strips Test strips of choice to check blood sugar daily #90 day  E11.65 300 strip 1     No current facility-administered medications on file prior to visit.      No Known Allergies  Past Medical History:   Diagnosis Date    Breast cancer (HCC)     right    Carpal tunnel syndrome     Colon polyps     Dizziness     Episodic ataxia (HCC) 2014    Fatty liver     Headache(784.0)     Hyperlipidemia     Hypertension     Meniere disease     Migraines     Obesity     Optic atrophy     left    Papilloma of breast     bilateral    Thoracic back pain     Tinnitus     Type 2 diabetes mellitus (HCC)

## 2024-04-08 ENCOUNTER — PATIENT MESSAGE (OUTPATIENT)
Dept: FAMILY MEDICINE CLINIC | Age: 64
End: 2024-04-08

## 2024-04-08 DIAGNOSIS — E11.65 UNCONTROLLED TYPE 2 DIABETES MELLITUS WITH HYPERGLYCEMIA (HCC): ICD-10-CM

## 2024-04-08 RX ORDER — BLOOD-GLUCOSE,RECEIVER,CONT
1 EACH MISCELLANEOUS 2 TIMES DAILY
Qty: 2 EACH | Refills: 2 | Status: SHIPPED | OUTPATIENT
Start: 2024-04-08

## 2024-04-08 NOTE — TELEPHONE ENCOUNTER
From: Nella Nuñez  To: Yessenia Jara Horace  Sent: 4/8/2024 10:57 AM EDT  Subject: nella araiza you started me on Lantus Solostar insulin went    to pick it up i got the insulin but no needles and i got my Gisele 3 sensor not my [employer] 3 reader and my phone want get the Gisele 3 sp so i need to get the reader to go with sensor and on my visit summary you got taking linagliptin 5mg tablet one a day i havent got none of these tablets if its possible i need to talk to you in your spare time thank you mrs nuñez

## 2024-04-09 ENCOUNTER — TELEPHONE (OUTPATIENT)
Dept: ADMINISTRATIVE | Age: 64
End: 2024-04-09

## 2024-04-09 NOTE — TELEPHONE ENCOUNTER
Submitted PA for FreeStyle Gisele 3 Dupont device   Via [facility] (Key: [id number]) STATUS: PENDING.    The patient's drug benefit plan provides coverage for other products which may be considered for treating your patient. Can your patient be treated with a formulary product? Available Formulary Alternative: Dexcom continuous glucose monitor [NOTE: If yes, provide your patient with a new prescription for the formulary product.]*     Follow up done daily; if no decision with in three days we will refax.  If another three days goes by with no decision will call the insurance for status.

## 2024-04-12 DIAGNOSIS — E78.2 MIXED HYPERLIPIDEMIA: ICD-10-CM

## 2024-04-12 RX ORDER — ROSUVASTATIN CALCIUM 20 MG/1
20 TABLET, COATED ORAL NIGHTLY
Qty: 90 TABLET | Refills: 0 | Status: SHIPPED | OUTPATIENT
Start: 2024-04-12

## 2024-04-15 NOTE — TELEPHONE ENCOUNTER
Followup on PA request. Please answer so that I can complete PA for medication if still needed.     If this requires a response please respond to the pool ( P MHCX PSC MEDICATION PRE-AUTH).      Thank you

## 2024-04-21 DIAGNOSIS — E11.65 UNCONTROLLED TYPE 2 DIABETES MELLITUS WITH HYPERGLYCEMIA (HCC): ICD-10-CM

## 2024-04-22 RX ORDER — BLOOD-GLUCOSE SENSOR
1 EACH MISCELLANEOUS 2 TIMES DAILY
Qty: 2 EACH | Refills: 2 | Status: SHIPPED | OUTPATIENT
Start: 2024-04-22 | End: 2024-04-23 | Stop reason: SDUPTHER

## 2024-04-23 DIAGNOSIS — E11.65 UNCONTROLLED TYPE 2 DIABETES MELLITUS WITH HYPERGLYCEMIA (HCC): ICD-10-CM

## 2024-04-23 DIAGNOSIS — E11.65 TYPE 2 DIABETES MELLITUS WITH HYPERGLYCEMIA, WITHOUT LONG-TERM CURRENT USE OF INSULIN (HCC): ICD-10-CM

## 2024-04-23 RX ORDER — BLOOD-GLUCOSE SENSOR
1 EACH MISCELLANEOUS 2 TIMES DAILY
Qty: 2 EACH | Refills: 2 | Status: SHIPPED | OUTPATIENT
Start: 2024-04-23

## 2024-04-24 ENCOUNTER — OFFICE VISIT (OUTPATIENT)
Dept: SURGERY | Age: 64
End: 2024-04-24
Payer: COMMERCIAL

## 2024-04-24 ENCOUNTER — HOSPITAL ENCOUNTER (OUTPATIENT)
Dept: WOMENS IMAGING | Age: 64
Discharge: HOME | End: 2024-04-24
Payer: COMMERCIAL

## 2024-04-24 VITALS
BODY MASS INDEX: 29.3 KG/M2 | HEIGHT: 62 IN | RESPIRATION RATE: 18 BRPM | OXYGEN SATURATION: 99 % | WEIGHT: 159.2 LBS | HEART RATE: 98 BPM

## 2024-04-24 VITALS — HEIGHT: 62 IN | BODY MASS INDEX: 29.44 KG/M2 | WEIGHT: 160 LBS

## 2024-04-24 DIAGNOSIS — Z12.39 ENCOUNTER FOR SCREENING BREAST EXAMINATION: Primary | ICD-10-CM

## 2024-04-24 DIAGNOSIS — Z90.11 S/P RIGHT MASTECTOMY: ICD-10-CM

## 2024-04-24 DIAGNOSIS — Z12.31 ENCOUNTER FOR SCREENING MAMMOGRAM FOR BREAST CANCER: ICD-10-CM

## 2024-04-24 DIAGNOSIS — Z85.3 HISTORY OF BREAST CANCER: ICD-10-CM

## 2024-04-24 DIAGNOSIS — Z08 ENCOUNTER FOR FOLLOW-UP SURVEILLANCE OF BREAST CANCER: ICD-10-CM

## 2024-04-24 DIAGNOSIS — Z85.3 ENCOUNTER FOR FOLLOW-UP SURVEILLANCE OF BREAST CANCER: Primary | ICD-10-CM

## 2024-04-24 DIAGNOSIS — Z08 ENCOUNTER FOR FOLLOW-UP SURVEILLANCE OF BREAST CANCER: Primary | ICD-10-CM

## 2024-04-24 DIAGNOSIS — Z85.3 ENCOUNTER FOR FOLLOW-UP SURVEILLANCE OF BREAST CANCER: ICD-10-CM

## 2024-04-24 PROCEDURE — 99213 OFFICE O/P EST LOW 20 MIN: CPT | Performed by: NURSE PRACTITIONER

## 2024-04-24 PROCEDURE — 77063 BREAST TOMOSYNTHESIS BI: CPT

## 2024-04-24 NOTE — PROGRESS NOTES
Surgical Breast Oncology       CC: Annual [id number] for breast check and mammogram     HPI: Jade Martines is a 63 y.o. woman here for annual follow up for breast check secondary to personal history of right breast cancer.  She is s/p right mastectomy with SLNB.  Completed 5 years of tamoxifen..    She has no breast related concerns today.  She states that she does perform routine self breast evaluations and has not noticed any new abnormalities such as masses, skin changes, color changes,nipple discharge, or changes to the nipple-areolar complex.     INTERVAL HX:  On 7/20/2016 she underwent a right mastectomy with sentinel lymph node biopsy and immediate tissue expander based reconstruction. Pathology identified DCIS, grade 2, ER positive, MN positive. There were 0/6 lymph nodes involved with carcinoma. [id number].     Tamoxifen initiated.     On 9/21/2018 she underwent left breast screening mammography. There are no concerning findings suggestive of malignancy. BI-RADS 2.     On 9/23/2019 she underwent left screening mammography. There are stable findings noted. There are no new concerning findings suggestive of malignancy. BI-RADS2    On 9/29/2020 she underwent left screening mammography.  There are stable findings noted.  There are new concerning findings suggestive of malignancy.  BI-RADS 2.    On 10/5/2021 she underwent left screening mammography.  Stable findings noted no new concerning findings suggestive of malignancy.  BI-RADS 2.    On 10/11/2022 she underwent left breast imaging and U/S.  1cm mass at 5:00, increasing in size from prior imaging, has sono correlate at 5:00, 2cm FN showing a hypoechoic well circumscribed 1x0.x8.0.x0.6 mass, biopsy is recommended.  BI-RADS4.    There is also a 4mm asymmetry left breast at 6:00, 6-7cm, FN, sonographically there are 3 small clustered microcysts in the left breast at 6:00, 2cmFN as well as at 6:00, 5cmFN, which one likely correlates with

## 2024-04-25 DIAGNOSIS — E11.65 TYPE 2 DIABETES MELLITUS WITH HYPERGLYCEMIA, WITHOUT LONG-TERM CURRENT USE OF INSULIN (HCC): ICD-10-CM

## 2024-06-18 DIAGNOSIS — E11.65 UNCONTROLLED TYPE 2 DIABETES MELLITUS WITH HYPERGLYCEMIA (HCC): ICD-10-CM

## 2024-06-18 RX ORDER — BLOOD-GLUCOSE SENSOR
1 EACH MISCELLANEOUS 2 TIMES DAILY
Qty: 2 EACH | Refills: 2 | Status: SHIPPED | OUTPATIENT
Start: 2024-06-18

## 2024-07-15 ENCOUNTER — OFFICE VISIT (OUTPATIENT)
Dept: FAMILY MEDICINE CLINIC | Age: 64
End: 2024-07-15
Payer: COMMERCIAL

## 2024-07-15 VITALS
WEIGHT: 167 LBS | BODY MASS INDEX: 30.54 KG/M2 | DIASTOLIC BLOOD PRESSURE: 70 MMHG | HEART RATE: 98 BPM | TEMPERATURE: 97.4 F | OXYGEN SATURATION: 99 % | SYSTOLIC BLOOD PRESSURE: 110 MMHG

## 2024-07-15 DIAGNOSIS — M54.50 ACUTE BILATERAL LOW BACK PAIN WITHOUT SCIATICA: Primary | ICD-10-CM

## 2024-07-15 PROBLEM — E11.9 TYPE 2 DIABETES MELLITUS (HCC): Status: ACTIVE | Noted: 2024-07-15

## 2024-07-15 PROCEDURE — 99214 OFFICE O/P EST MOD 30 MIN: CPT | Performed by: NURSE PRACTITIONER

## 2024-07-15 PROCEDURE — 3074F SYST BP LT 130 MM HG: CPT | Performed by: NURSE PRACTITIONER

## 2024-07-15 PROCEDURE — 3078F DIAST BP <80 MM HG: CPT | Performed by: NURSE PRACTITIONER

## 2024-07-15 PROCEDURE — 96372 THER/PROPH/DIAG INJ SC/IM: CPT | Performed by: NURSE PRACTITIONER

## 2024-07-15 RX ORDER — TIZANIDINE 2 MG/1
2 TABLET ORAL 3 TIMES DAILY PRN
Qty: 30 TABLET | Refills: 0 | Status: SHIPPED | OUTPATIENT
Start: 2024-07-15

## 2024-07-15 RX ORDER — KETOROLAC TROMETHAMINE 30 MG/ML
30 INJECTION, SOLUTION INTRAMUSCULAR; INTRAVENOUS ONCE
Status: COMPLETED | OUTPATIENT
Start: 2024-07-15 | End: 2024-07-15

## 2024-07-15 RX ORDER — DICLOFENAC SODIUM 75 MG/1
75 TABLET, DELAYED RELEASE ORAL 2 TIMES DAILY
Qty: 60 TABLET | Refills: 0 | Status: SHIPPED | OUTPATIENT
Start: 2024-07-15

## 2024-07-15 RX ADMIN — KETOROLAC TROMETHAMINE 30 MG: 30 INJECTION, SOLUTION INTRAMUSCULAR; INTRAVENOUS at 10:21

## 2024-07-15 ASSESSMENT — ENCOUNTER SYMPTOMS
SHORTNESS OF BREATH: 0
VOMITING: 0
COUGH: 0
NAUSEA: 0
DIARRHEA: 0

## 2024-07-15 NOTE — PROGRESS NOTES
Jade Martines  : 1960  Encounter date: 7/15/2024    This is a 63 y.o. female who presents with  Chief Complaint   Patient presents with    Lower Back Pain     Patient c/o low back pain x 1 week, NKI.      History of present illness:    HPI   Presents to clinic today with concerns for low back pain that started approximately 7-8 days prior.  Reports no acute.  Pain has been progressively worsening - had to take off work due to so much discomfort.  Denies any urinary issues.  No hx of kidney stones.  Having regular bowel movements.  No blood in stool.  Rates pain 8/10 - persistent - dull to sharp.  No weakness in legs.  No falls.  Has been taking ibuprofen 800mg with minimal relief.  She has had back issues in the past and taken tramadol - reports tried one at home and didn't help.  She does have very poorly controlled diabetes - last A1C 11.8 in April - on insulin with minimal improvement in blood sugars.     No Known Allergies  Current Outpatient Medications   Medication Sig Dispense Refill    tiZANidine (ZANAFLEX) 2 MG tablet Take 1 tablet by mouth 3 times daily as needed (back spasm) 30 tablet 0    diclofenac (VOLTAREN) 75 MG EC tablet Take 1 tablet by mouth 2 times daily 60 tablet 0    Continuous Glucose Sensor (FREESTYLE MAGO 3 SENSOR) MISC 1 Application by Does not apply route in the morning and at bedtime 2 each 2    rosuvastatin (CRESTOR) 20 MG tablet TAKE 1 TABLET BY MOUTH EVERY DAY AT NIGHT 90 tablet 0    linagliptin (TRADJENTA) 5 MG tablet TAKE ONE TABLET BY MOUTH DAILY 30 tablet 5    Continuous Blood Gluc  (FREESTYLE MAGO 3 READER) CATRACHO 1 Application by Does not apply route in the morning and at bedtime 2 each 2    Insulin Pen Needle 32G X 4 MM MISC 1 each by Does not apply route daily 100 each 3    insulin glargine (LANTUS SOLOSTAR) 100 UNIT/ML injection pen Inject 10 Units into the skin nightly 5 Adjustable Dose Pre-filled Pen Syringe 1    glimepiride (AMARYL) 4 MG tablet Take 2

## 2024-07-22 ENCOUNTER — OFFICE VISIT (OUTPATIENT)
Dept: FAMILY MEDICINE CLINIC | Age: 64
End: 2024-07-22
Payer: COMMERCIAL

## 2024-07-22 VITALS
BODY MASS INDEX: 29.81 KG/M2 | DIASTOLIC BLOOD PRESSURE: 72 MMHG | SYSTOLIC BLOOD PRESSURE: 128 MMHG | WEIGHT: 163 LBS | TEMPERATURE: 97.6 F | OXYGEN SATURATION: 98 % | HEART RATE: 78 BPM

## 2024-07-22 DIAGNOSIS — E11.65 UNCONTROLLED TYPE 2 DIABETES MELLITUS WITH HYPERGLYCEMIA (HCC): Primary | ICD-10-CM

## 2024-07-22 DIAGNOSIS — E78.2 MIXED HYPERLIPIDEMIA: ICD-10-CM

## 2024-07-22 LAB — HBA1C MFR BLD: 9 %

## 2024-07-22 PROCEDURE — 99214 OFFICE O/P EST MOD 30 MIN: CPT | Performed by: NURSE PRACTITIONER

## 2024-07-22 PROCEDURE — 3052F HG A1C>EQUAL 8.0%<EQUAL 9.0%: CPT | Performed by: NURSE PRACTITIONER

## 2024-07-22 PROCEDURE — 3074F SYST BP LT 130 MM HG: CPT | Performed by: NURSE PRACTITIONER

## 2024-07-22 PROCEDURE — 83036 HEMOGLOBIN GLYCOSYLATED A1C: CPT | Performed by: NURSE PRACTITIONER

## 2024-07-22 PROCEDURE — 3078F DIAST BP <80 MM HG: CPT | Performed by: NURSE PRACTITIONER

## 2024-07-22 RX ORDER — INSULIN GLARGINE 100 [IU]/ML
20 INJECTION, SOLUTION SUBCUTANEOUS NIGHTLY
Qty: 5 ADJUSTABLE DOSE PRE-FILLED PEN SYRINGE | Refills: 1 | Status: SHIPPED | OUTPATIENT
Start: 2024-07-22

## 2024-07-22 NOTE — PROGRESS NOTES
Alcohol/week: 0.0 standard drinks of alcohol     Comment: occasional        Review of Systems    Objective:    /72 (Site: Right Upper Arm, Position: Sitting, Cuff Size: Medium Adult)   Pulse 78   Temp 97.6 °F (36.4 °C) (Infrared)   Wt 73.9 kg (163 lb)   SpO2 98%   BMI 29.81 kg/m²   Weight - Scale: 73.9 kg (163 lb)     BP Readings from Last 3 Encounters:   07/22/24 128/72   07/15/24 110/70   04/03/24 124/76     Wt Readings from Last 3 Encounters:   07/22/24 73.9 kg (163 lb)   07/15/24 75.8 kg (167 lb)   04/24/24 72.6 kg (160 lb)     BMI Readings from Last 3 Encounters:   07/22/24 29.81 kg/m²   07/15/24 30.54 kg/m²   04/24/24 29.26 kg/m²       Physical Exam  Vitals reviewed.   Constitutional:       Appearance: Normal appearance. She is well-developed.   Cardiovascular:      Rate and Rhythm: Normal rate and regular rhythm.      Pulses: Normal pulses.      Heart sounds: Normal heart sounds. No murmur heard.  Pulmonary:      Effort: Pulmonary effort is normal.      Breath sounds: Normal breath sounds.   Skin:     General: Skin is warm and dry.   Neurological:      Mental Status: She is alert and oriented to person, place, and time.   Psychiatric:         Mood and Affect: Mood normal.         Assessment/Plan    1. Uncontrolled type 2 diabetes mellitus with hyperglycemia (HCC)  Uncontrolled  Advised low CHO diet  Discussed food options  Continue glucose monitoring  - POCT glycosylated hemoglobin (Hb A1C)- 9.0  - insulin glargine (LANTUS SOLOSTAR) 100 UNIT/ML injection pen; Inject 20 Units into the skin nightly  Dispense: 5 Adjustable Dose Pre-filled Pen Syringe; Refill: 1    Time Spent on discharge is more than 30 minutes in the examination, evaluation, counseling and review of medications and discharge plan.      Return in about 3 months (around 10/22/2024) for annual check up, lab review, medication re-check, diabetes re-check.    This dictation was generated by voice recognition computer software.  Although

## 2024-08-11 DIAGNOSIS — M54.50 ACUTE BILATERAL LOW BACK PAIN WITHOUT SCIATICA: ICD-10-CM

## 2024-08-13 RX ORDER — DICLOFENAC SODIUM 75 MG/1
75 TABLET, DELAYED RELEASE ORAL 2 TIMES DAILY
Qty: 180 TABLET | Refills: 0 | Status: SHIPPED | OUTPATIENT
Start: 2024-08-13

## 2024-08-15 ENCOUNTER — HOSPITAL ENCOUNTER (EMERGENCY)
Age: 64
Discharge: HOME | End: 2024-08-15
Attending: INTERNAL MEDICINE
Payer: COMMERCIAL

## 2024-08-15 ENCOUNTER — APPOINTMENT (OUTPATIENT)
Dept: GENERAL RADIOLOGY | Age: 64
End: 2024-08-15
Payer: COMMERCIAL

## 2024-08-15 VITALS
DIASTOLIC BLOOD PRESSURE: 84 MMHG | HEIGHT: 62 IN | OXYGEN SATURATION: 97 % | RESPIRATION RATE: 24 BRPM | TEMPERATURE: 98.1 F | SYSTOLIC BLOOD PRESSURE: 156 MMHG | BODY MASS INDEX: 31.03 KG/M2 | HEART RATE: 92 BPM | WEIGHT: 168.6 LBS

## 2024-08-15 DIAGNOSIS — R07.89 ACUTE CHEST WALL PAIN: Primary | ICD-10-CM

## 2024-08-15 LAB
ANION GAP SERPL CALCULATED.3IONS-SCNC: 13 MMOL/L (ref 3–16)
BASOPHILS # BLD: 0.1 K/UL (ref 0–0.2)
BASOPHILS NFR BLD: 1.5 %
BUN SERPL-MCNC: 15 MG/DL (ref 7–20)
CALCIUM SERPL-MCNC: 9.5 MG/DL (ref 8.3–10.6)
CHLORIDE SERPL-SCNC: 104 MMOL/L (ref 99–110)
CO2 SERPL-SCNC: 21 MMOL/L (ref 21–32)
CREAT SERPL-MCNC: 0.8 MG/DL (ref 0.6–1.2)
D-DIMER QUANTITATIVE: 0.36 UG/ML FEU (ref 0–0.6)
DEPRECATED RDW RBC AUTO: 13.6 % (ref 12.4–15.4)
EKG ATRIAL RATE: 90 BPM
EKG DIAGNOSIS: NORMAL
EKG P AXIS: 6 DEGREES
EKG P-R INTERVAL: 136 MS
EKG Q-T INTERVAL: 376 MS
EKG QRS DURATION: 74 MS
EKG QTC CALCULATION (BAZETT): 459 MS
EKG R AXIS: -2 DEGREES
EKG T AXIS: 13 DEGREES
EKG VENTRICULAR RATE: 90 BPM
EOSINOPHIL # BLD: 0.4 K/UL (ref 0–0.6)
EOSINOPHIL NFR BLD: 4.1 %
GFR SERPLBLD CREATININE-BSD FMLA CKD-EPI: 83 ML/MIN/{1.73_M2}
GLUCOSE SERPL-MCNC: 200 MG/DL (ref 70–99)
HCT VFR BLD AUTO: 43.3 % (ref 36–48)
HGB BLD-MCNC: 14.5 G/DL (ref 12–16)
LYMPHOCYTES # BLD: 2.9 K/UL (ref 1–5.1)
LYMPHOCYTES NFR BLD: 31.7 %
MCH RBC QN AUTO: 28.5 PG (ref 26–34)
MCHC RBC AUTO-ENTMCNC: 33.5 G/DL (ref 31–36)
MCV RBC AUTO: 85.1 FL (ref 80–100)
MONOCYTES # BLD: 0.7 K/UL (ref 0–1.3)
MONOCYTES NFR BLD: 7.8 %
NEUTROPHILS # BLD: 5.1 K/UL (ref 1.7–7.7)
NEUTROPHILS NFR BLD: 54.9 %
PLATELET # BLD AUTO: 301 K/UL (ref 135–450)
PMV BLD AUTO: 8.1 FL (ref 5–10.5)
POTASSIUM SERPL-SCNC: 4.1 MMOL/L (ref 3.5–5.1)
RBC # BLD AUTO: 5.09 M/UL (ref 4–5.2)
SODIUM SERPL-SCNC: 138 MMOL/L (ref 136–145)
TROPONIN, HIGH SENSITIVITY: 10 NG/L (ref 0–14)
WBC # BLD AUTO: 9.3 K/UL (ref 4–11)

## 2024-08-15 PROCEDURE — 80048 BASIC METABOLIC PNL TOTAL CA: CPT

## 2024-08-15 PROCEDURE — 84484 ASSAY OF TROPONIN QUANT: CPT

## 2024-08-15 PROCEDURE — 71045 X-RAY EXAM CHEST 1 VIEW: CPT

## 2024-08-15 PROCEDURE — 99285 EMERGENCY DEPT VISIT HI MDM: CPT

## 2024-08-15 PROCEDURE — 85379 FIBRIN DEGRADATION QUANT: CPT

## 2024-08-15 PROCEDURE — 93005 ELECTROCARDIOGRAM TRACING: CPT | Performed by: INTERNAL MEDICINE

## 2024-08-15 PROCEDURE — 93010 ELECTROCARDIOGRAM REPORT: CPT | Performed by: INTERNAL MEDICINE

## 2024-08-15 PROCEDURE — 85025 COMPLETE CBC W/AUTO DIFF WBC: CPT

## 2024-08-15 RX ORDER — METHOCARBAMOL 500 MG/1
500 TABLET, FILM COATED ORAL 4 TIMES DAILY
Qty: 20 TABLET | Refills: 0 | Status: SHIPPED | OUTPATIENT
Start: 2024-08-15 | End: 2024-08-20

## 2024-08-15 RX ORDER — LIDOCAINE 4 G/G
1 PATCH TOPICAL DAILY
Qty: 30 PATCH | Refills: 0 | Status: SHIPPED | OUTPATIENT
Start: 2024-08-15 | End: 2024-09-14

## 2024-08-15 RX ORDER — FENTANYL CITRATE 50 UG/ML
25 INJECTION, SOLUTION INTRAMUSCULAR; INTRAVENOUS ONCE
Status: DISCONTINUED | OUTPATIENT
Start: 2024-08-15 | End: 2024-08-15 | Stop reason: HOSPADM

## 2024-08-15 ASSESSMENT — PAIN DESCRIPTION - ORIENTATION: ORIENTATION: LEFT

## 2024-08-15 ASSESSMENT — PAIN SCALES - GENERAL: PAINLEVEL_OUTOF10: 6

## 2024-08-15 ASSESSMENT — PAIN DESCRIPTION - LOCATION: LOCATION: CHEST

## 2024-08-15 ASSESSMENT — PAIN - FUNCTIONAL ASSESSMENT: PAIN_FUNCTIONAL_ASSESSMENT: NONE - DENIES PAIN

## 2024-08-15 ASSESSMENT — PAIN DESCRIPTION - DESCRIPTORS: DESCRIPTORS: SHARP;PRESSURE

## 2024-08-15 NOTE — ED PROVIDER NOTES
acute process.             Labs  Results for orders placed or performed during the hospital encounter of 08/15/24   CBC with Auto Differential   Result Value Ref Range    WBC 9.3 4.0 - 11.0 K/uL    RBC 5.09 4.00 - 5.20 M/uL    Hemoglobin 14.5 12.0 - 16.0 g/dL    Hematocrit 43.3 36.0 - 48.0 %    MCV 85.1 80.0 - 100.0 fL    MCH 28.5 26.0 - 34.0 pg    MCHC 33.5 31.0 - 36.0 g/dL    RDW 13.6 12.4 - 15.4 %    Platelets 301 135 - 450 K/uL    MPV 8.1 5.0 - 10.5 fL    Neutrophils % 54.9 %    Lymphocytes % 31.7 %    Monocytes % 7.8 %    Eosinophils % 4.1 %    Basophils % 1.5 %    Neutrophils Absolute 5.1 1.7 - 7.7 K/uL    Lymphocytes Absolute 2.9 1.0 - 5.1 K/uL    Monocytes Absolute 0.7 0.0 - 1.3 K/uL    Eosinophils Absolute 0.4 0.0 - 0.6 K/uL    Basophils Absolute 0.1 0.0 - 0.2 K/uL   Basic Metabolic Panel   Result Value Ref Range    Sodium 138 136 - 145 mmol/L    Potassium 4.1 3.5 - 5.1 mmol/L    Chloride 104 99 - 110 mmol/L    CO2 21 21 - 32 mmol/L    Anion Gap 13 3 - 16    Glucose 200 (H) 70 - 99 mg/dL    BUN 15 7 - 20 mg/dL    Creatinine 0.8 0.6 - 1.2 mg/dL    Est, Glom Filt Rate 83 >60    Calcium 9.5 8.3 - 10.6 mg/dL   Troponin   Result Value Ref Range    Troponin, High Sensitivity 10 0 - 14 ng/L   D-Dimer, Quantitative   Result Value Ref Range    D-Dimer, Quant 0.36 0.00 - 0.60 ug/mL FEU       SEP-1  Is this patient to be included in the SEP-1 Core Measure due to severe sepsis or septic shock?   No    Screenings                    Medications Given During ED Visit  Medications   fentaNYL (SUBLIMAZE) injection 25 mcg (has no administration in time range)       Records Reviewed  After reviewing the patient's medical records patient does have a history of breast cancer.    Social Determinants of Health  None    Chronic Conditions affecting care   has a past medical history of Breast cancer (HCC) (2005), Carpal tunnel syndrome, Colon polyps, Dizziness, Episodic ataxia (HCC) (11/24/2014), Fatty liver, Headache(784.0),  Hyperlipidemia, Hypertension, Meniere disease, Migraines, Obesity, Optic atrophy, Papilloma of breast, Thoracic back pain, Tinnitus, Type 2 diabetes mellitus (HCC) (11/11/2015), Type II or unspecified type diabetes mellitus without mention of complication, not stated as uncontrolled, Urge incontinence, and Vertigo.    MDM and ED Course  EKG did not show any acute findings.  Patient stated that the symptoms did start Sunday and the troponin is not elevated.  D-dimer is also not elevated at 0.36.  Patient is hemodynamically stable.  Patient was given fentanyl and is feeling better.  The pain that is sharp in nature and short in duration seems more musculoskeletal.  Chest x-ray also did not show any acute findings.  Patient is nontoxic and nonseptic in appearance.  Patient will be sent home with a muscle relaxer and the patient does appear to be on diclofenac at home.  Patient was encouraged to follow-up with her primary care physician if symptoms do not improve.  Patient understands the plan.  She is welcome to return to the ER if needed.  Patient understands the plan.      Critical Care Time: 0 Minutes  This excludes separately billable procedures.      Final Impression  1. Acute chest wall pain        Blood pressure (!) 156/84, pulse 92, temperature 98.1 °F (36.7 °C), temperature source Oral, resp. rate 24, height 1.575 m (5' 2\"), weight 76.5 kg (168 lb 9.6 oz), SpO2 97%, not currently breastfeeding.     Disposition:  DISPOSITION Decision To Discharge 08/15/2024 11:33:39 AM  Condition at Disposition: Good      Patient Referrals:  Yessenia Vu, SOHAM - NP  [address] [place] [address]  [phone]      As needed      Discharge Medications:  Discharge Medication List as of 8/15/2024 11:37 AM        START taking these medications    Details   methocarbamol (ROBAXIN) 500 MG tablet Take 1 tablet by mouth 4 times daily for 5 days, Disp-20 tablet, R-0Normal      lidocaine 4 % external patch Place 1

## 2024-08-16 ENCOUNTER — CARE COORDINATION (OUTPATIENT)
Dept: CARE COORDINATION | Age: 64
End: 2024-08-16

## 2024-08-16 ENCOUNTER — ENROLLMENT (OUTPATIENT)
Dept: CARE COORDINATION | Age: 64
End: 2024-08-16

## 2024-08-16 NOTE — CARE COORDINATION
Ambulatory Care Coordination  ED Follow up Call    Reason for ED visit:  Chest Wall Pain   Status:     not changed    Did you call your PCP prior to going to the ED?  No      Did you receive a discharge instructions from the Emergency Room? Yes  Review of Instructions:     Understands what to report/when to return?:  Yes   Understands discharge instructions?:  Yes   Following discharge instructions?:  Yes   If not why? N/A     Are there any new complaints of pain? No  New Pain Meds? Yes    Constipation prophylaxis needed?  N/A    If you have a wound is the dressing clean, dry, and intact? N/A  Understands wound care regimen? N/A    Are there any other complaints/concerns that you wish to tell your provider?   None at this time.    FU appts/Provider:    Future Appointments   Date Time Provider Department Center   11/18/2024  9:00 AM Yessenia Vu, APRN - NP [facility] ECC DEP   4/29/2025 10:30 AM [facility] MAMMO RM 2 [facility]   4/29/2025 11:30 AM Negrita Collins APRN - CNP [facility] SURG MMA           New Medications?:   Yes      Medication Reconciliation by phone - Yes  Understands Medications?  Yes  Taking Medications? Yes  Can you swallow your pills?  Yes    Any further needs in the home i.e. Equipment?  Not Applicable    Link to services in community?:  N/A   Which services:  N/A      ACM made outreach to patient for care management and ED follow up. Patient advised ACM that she is feeling \"about the same\". She states that her chest is still sore, but denies any new or worsening symptoms. Patient has started taking the muscle relaxer prescribed and is using pain patches as directed. ACM discussed scheduling follow up with PCP. Patient will wait until after weekend to allow time for medications to help. Patient has received discharge instructions and VU for when to call PCP vs when to return to ED. Patient is aware of on call providers available at PCP office for any non emergent questions or

## 2024-08-23 ENCOUNTER — CARE COORDINATION (OUTPATIENT)
Dept: CARE COORDINATION | Age: 64
End: 2024-08-23

## 2024-08-23 NOTE — CARE COORDINATION
Ambulatory Care Coordination Note     8/23/2024 9:05 AM     Patient outreach attempt by this ACM today to perform care management follow up . ACM was unable to reach the patient by telephone today; left voice message requesting a return phone call to this ACM.     ACM: Christina Sanchez, RN     Care Summary Note: ACM left message on machine, waiting for return call.     PCP/Specialist follow up:   Future Appointments         Provider Specialty Dept Phone    11/18/2024 9:00 AM Yessenia Vu APRN - NP Family Medicine [phone]    4/29/2025 10:30 AM (Arrive by 10:15 AM) [facility] MAMMO RM 2 Radiology [phone]    4/29/2025 11:30 AM Negrita Collins APRN - CNP General Surgery [phone]            Follow Up:   Plan for next AC outreach in approximately 1 week to complete:  - disease specific assessments  - SDOH assessments  - medication review  - advance care planning  - goal progression  - education   - RPM.

## 2024-08-30 ENCOUNTER — CARE COORDINATION (OUTPATIENT)
Dept: CARE COORDINATION | Age: 64
End: 2024-08-30

## 2024-08-30 NOTE — CARE COORDINATION
Ambulatory Care Coordination Note     8/30/2024 9:14 AM     Patient outreach attempt by this ACM today to perform care management follow up . ACM was unable to reach the patient by telephone today; left voice message requesting a return phone call to this ACM.     ACM: Christina Sanchez, RN     Care Summary Note: ACM left message on machine requesting return call.     PCP/Specialist follow up:   Future Appointments         Provider Specialty Dept Phone    11/18/2024 9:00 AM Yessenia Vu APRN - NP Family Medicine [phone]    4/29/2025 10:30 AM (Arrive by 10:15 AM) [facility] MAMMO RM 2 Radiology [phone]    4/29/2025 11:30 AM Negrita Collins APRN - CNP General Surgery [phone]            Follow Up:   Plan for next AC outreach in approximately 1 week to complete:  - disease specific assessments  - SDOH assessments  - medication review  - advance care planning  - goal progression  - education   - RPM.

## 2024-09-06 ENCOUNTER — CARE COORDINATION (OUTPATIENT)
Dept: CARE COORDINATION | Age: 64
End: 2024-09-06

## 2024-09-06 NOTE — CARE COORDINATION
ACM sent my chart message to patient as final outreach attempt. ACM will end care management if no return call.

## 2024-09-13 ENCOUNTER — CARE COORDINATION (OUTPATIENT)
Dept: CARE COORDINATION | Age: 64
End: 2024-09-13

## 2024-10-09 RX ORDER — GLIMEPIRIDE 4 MG/1
8 TABLET ORAL
Qty: 180 TABLET | Refills: 0 | Status: SHIPPED | OUTPATIENT
Start: 2024-10-09 | End: 2025-01-07

## 2024-11-09 DIAGNOSIS — M54.50 ACUTE BILATERAL LOW BACK PAIN WITHOUT SCIATICA: ICD-10-CM

## 2024-11-11 RX ORDER — DICLOFENAC SODIUM 75 MG/1
75 TABLET, DELAYED RELEASE ORAL 2 TIMES DAILY
Qty: 180 TABLET | Refills: 0 | Status: SHIPPED | OUTPATIENT
Start: 2024-11-11

## 2025-02-11 NOTE — PATIENT INSTRUCTIONS
Healthy Lifestyle Recommendations: healthy diet (decrease consumption of red meat, increase fresh fruits and vegetables), decreased alcohol consumption (less than 4 drinks/week), adequate sleep (goal 6-8 hours), routine exercise (goal 150 minutes/week or greater), weight control. Patient Education        Breast Self-Exam: Care Instructions  Your Care Instructions     A breast self-exam is when you check your breasts for lumps or changes. This regular exam helps you learn how your breasts normally look and feel. Most breast problems or changes are not because of cancer. Breast self-exam is not a substitute for a mammogram. Having regular breast exams by your doctor and regular mammograms improve your chances of finding any problems with your breasts. Some women set a time each month to do a step-by-step breast self-exam. Other women like a less formal system. They might look at their breasts as they brush their teeth, or feel their breasts once in a while in the shower. If you notice a change in your breast, tell your doctor. Follow-up care is a key part of your treatment and safety. Be sure to make and go to all appointments, and call your doctor if you are having problems. It's also a good idea to know your test results and keep a list of the medicines you take. How do you do a breast self-exam?  The best time to examine your breasts is usually one week after your menstrual period begins. Your breasts should not be tender then. If you do not have periods, you might do your exam on a day of the month that is easy to remember. To examine your breasts:  Remove all your clothes above the waist and lie down. When you are lying down, your breast tissue spreads evenly over your chest wall, which makes it easier to feel all your breast tissue. Use the pads--not the fingertips--of the 3 middle fingers of your left hand to check your right breast. Move your fingers slowly in small coin-sized circles that overlap.   Use LM to see which appointment she would like to come for.    three levels of pressure to feel of all your breast tissue. Use light pressure to feel the tissue close to the skin surface. Use medium pressure to feel a little deeper. Use firm pressure to feel your tissue close to your breastbone and ribs. Use each pressure level to feel your breast tissue before moving on to the next spot. Check your entire breast, moving up and down as if following a strip from the collarbone to the bra line, and from the armpit to the ribs. Repeat until you have covered the entire breast.  Repeat this procedure for your left breast, using the pads of the 3 middle fingers of your right hand. To examine your breasts while in the shower:  Place one arm over your head and lightly soap your breast on that side. Using the pads of your fingers, gently move your hand over your breast (in the strip pattern described above), feeling carefully for any lumps or changes. Repeat for the other breast.  Have your doctor inspect anything you notice to see if you need further testing. Where can you learn more? Go to [email or web address] org and sign in to your [employer] account. Enter P148 in the [facility] box to learn more about \"Breast Self-Exam: Care Instructions. \"     If you do not have an account, please click on the \"Sign Up Now\" link. Current as of: November 22, 2021               Content Version: 13.4  © 8780-2357 [employer]wise, Incorporated. Care instructions adapted under license by [facility] ([facility]). If you have questions about a medical condition or this instruction, always ask your healthcare professional. [place] [address] any warranty or liability for your use of this information.

## 2025-02-27 DIAGNOSIS — M54.50 ACUTE BILATERAL LOW BACK PAIN WITHOUT SCIATICA: ICD-10-CM

## 2025-02-27 RX ORDER — DICLOFENAC SODIUM 75 MG/1
75 TABLET, DELAYED RELEASE ORAL 2 TIMES DAILY
Qty: 180 TABLET | Refills: 0 | Status: SHIPPED | OUTPATIENT
Start: 2025-02-27

## 2025-04-29 ENCOUNTER — HOSPITAL ENCOUNTER (OUTPATIENT)
Dept: WOMENS IMAGING | Age: 65
Discharge: HOME | End: 2025-04-29
Payer: COMMERCIAL

## 2025-04-29 ENCOUNTER — OFFICE VISIT (OUTPATIENT)
Dept: SURGERY | Age: 65
End: 2025-04-29
Payer: COMMERCIAL

## 2025-04-29 VITALS — BODY MASS INDEX: 29.44 KG/M2 | HEIGHT: 62 IN | WEIGHT: 160 LBS

## 2025-04-29 VITALS
RESPIRATION RATE: 16 BRPM | HEIGHT: 62 IN | OXYGEN SATURATION: 97 % | BODY MASS INDEX: 28.56 KG/M2 | WEIGHT: 155.2 LBS | HEART RATE: 91 BPM

## 2025-04-29 DIAGNOSIS — Z12.39 ENCOUNTER FOR SCREENING BREAST EXAMINATION: Primary | ICD-10-CM

## 2025-04-29 DIAGNOSIS — Z85.3 ENCOUNTER FOR FOLLOW-UP SURVEILLANCE OF BREAST CANCER: ICD-10-CM

## 2025-04-29 DIAGNOSIS — Z85.3 HISTORY OF BREAST CANCER: ICD-10-CM

## 2025-04-29 DIAGNOSIS — Z85.3 ENCOUNTER FOR FOLLOW-UP SURVEILLANCE OF BREAST CANCER: Primary | ICD-10-CM

## 2025-04-29 DIAGNOSIS — Z90.11 S/P RIGHT MASTECTOMY: ICD-10-CM

## 2025-04-29 DIAGNOSIS — Z12.31 ENCOUNTER FOR SCREENING MAMMOGRAM FOR BREAST CANCER: ICD-10-CM

## 2025-04-29 DIAGNOSIS — Z08 ENCOUNTER FOR FOLLOW-UP SURVEILLANCE OF BREAST CANCER: Primary | ICD-10-CM

## 2025-04-29 DIAGNOSIS — Z08 ENCOUNTER FOR FOLLOW-UP SURVEILLANCE OF BREAST CANCER: ICD-10-CM

## 2025-04-29 PROCEDURE — 99213 OFFICE O/P EST LOW 20 MIN: CPT | Performed by: NURSE PRACTITIONER

## 2025-04-29 PROCEDURE — 77063 BREAST TOMOSYNTHESIS BI: CPT

## 2025-04-29 NOTE — PROGRESS NOTES
complication, not stated as uncontrolled     Urge incontinence     Vertigo        Past Surgical History:   Procedure Laterality Date    BREAST BIOPSY Bilateral 06/15/2016    : RIGHT NEEDLE LOCALIZATION OF TWO SITES EXCISIONAL BREAST    BREAST SURGERY Bilateral 02/15/2017    SECOND STAGE RIGHT BREAST RECONSTRUCTION WITH PLACEMENT OF SILICONE BREAST IMPLANT ; left breast reduction    CARPAL TUNNEL RELEASE Right 2019    RIGHT CARPAL TUNNEL RELEASE performed by Ulises Nuñez MD at [facility] OR     SECTION      MASTECTOMY Right 2016     RIGHT SKIN SAPARING MASTECTOMY WITH Tc99 AND METHYLANE BLUE,    US BREAST BIOPSY W LOC DEVICE 1ST LESION LEFT Left 10/14/2022    US BREAST NEEDLE BIOPSY LEFT 10/14/2022 [facility] ULTRASOUND       Family History   Problem Relation Age of Onset    Arthritis Mother     Asthma Mother     Diabetes Mother     Heart Disease Mother     High Blood Pressure Mother     High Cholesterol Mother     Hypertension Mother     Osteoarthritis Mother     Arthritis Father     Asthma Father     Cancer Father     High Cholesterol Father     Diabetes Father     Hypertension Father     Diabetes Sister     Asthma Son     Breast Cancer Neg Hx     Ovarian Cancer Neg Hx        Allergies as of 2025    (No Known Allergies)       Social History     Tobacco Use    Smoking status: Former     Current packs/day: 0.00     Average packs/day: 1 pack/day for 10.0 years (10.0 ttl pk-yrs)     Types: Cigarettes     Start date:      Quit date:      Years since quittin.3    Smokeless tobacco: Never   Vaping Use    Vaping status: Never Used   Substance Use Topics    Alcohol use: Yes     Alcohol/week: 0.0 standard drinks of alcohol     Comment: occasional    Drug use: No       Current Outpatient Medications on File Prior to Visit   Medication Sig Dispense Refill    diclofenac (VOLTAREN) 75 MG EC tablet Take 1 tablet by mouth 2 times daily 180 tablet 0    insulin glargine (LANTUS SOLOSTAR) 100 UNIT/ML

## (undated) DEVICE — SOLUTION IV IRRIG 500ML 0.9% SODIUM CHL 2F7123

## (undated) DEVICE — SUTURE CHROMIC GUT SZ 4-0 L27IN ABSRB BRN FS-2 L19MM 3/8 635H

## (undated) DEVICE — NEEDLE HYPO 18GA L1.5IN THN WALL PIVOTING SHLD BVL ORIENTED

## (undated) DEVICE — SYRINGE MED 10ML LUERLOCK TIP W/O SFTY DISP

## (undated) DEVICE — SHEET,DRAPE,53X77,STERILE: Brand: MEDLINE

## (undated) DEVICE — GLOVE SURG SZ 65 L12IN FNGR THK87MIL WHT LTX FREE

## (undated) DEVICE — GOWN,SIRUS,POLYRNF,BRTHSLV,XL,30/CS: Brand: MEDLINE

## (undated) DEVICE — DRAPE HND W114XL142IN BLU POLYPR W O PCH FEN CRD AND TB HLDR

## (undated) DEVICE — ZIMMER® STERILE DISPOSABLE TOURNIQUET CUFF WITH PLC, DUAL PORT, SINGLE BLADDER, 18 IN. (46 CM)

## (undated) DEVICE — UNDERGLOVE SURG SZ 8 FNGR THK0.21MIL GRN LTX BEAD CUF

## (undated) DEVICE — COVER LT HNDL BLU PLAS

## (undated) DEVICE — SPONGE GZ W4XL4IN COT 12 PLY TYP VII WVN C FLD DSGN

## (undated) DEVICE — DRESSING PETRO W3XL3IN OIL EMUL N ADH GZ KNIT IMPREG CELOS

## (undated) DEVICE — GOWN SIRUS NONREIN XL W/TWL: Brand: MEDLINE INDUSTRIES, INC.

## (undated) DEVICE — MINOR SET UP PK

## (undated) DEVICE — NEEDLE HYPO 25GA L1.5IN BVL ORIENTED ECLIPSE

## (undated) DEVICE — BANDAGE COMPR W4INXL12FT E DISP ESMARCH EVEN

## (undated) DEVICE — GLOVE SURG SZ 8 L12IN FNGR THK94MIL STD WHT LTX SYN POLYMER

## (undated) DEVICE — CHLORAPREP 26ML ORANGE

## (undated) DEVICE — Z DISCONTINUED USE 2275676 GLOVE SURG SZ 65 L12IN FNGR THK87MIL DK GRN LTX FREE ISOLEX

## (undated) DEVICE — BANDAGE COMPR W2INXL5YD TAN BRTH SELF ADH WRP W/ HND TEAR